# Patient Record
Sex: FEMALE | Race: WHITE | NOT HISPANIC OR LATINO | Employment: OTHER | URBAN - METROPOLITAN AREA
[De-identification: names, ages, dates, MRNs, and addresses within clinical notes are randomized per-mention and may not be internally consistent; named-entity substitution may affect disease eponyms.]

---

## 2017-04-03 ENCOUNTER — ANESTHESIA EVENT (OUTPATIENT)
Dept: SURGERY | Facility: CLINIC | Age: 77
DRG: 483 | End: 2017-04-03
Payer: MEDICARE

## 2017-04-03 ENCOUNTER — HOSPITAL ENCOUNTER (INPATIENT)
Facility: CLINIC | Age: 77
LOS: 1 days | Discharge: HOME | DRG: 483 | End: 2017-04-04
Attending: ORTHOPAEDIC SURGERY | Admitting: ORTHOPAEDIC SURGERY
Payer: MEDICARE

## 2017-04-03 ENCOUNTER — ANESTHESIA (OUTPATIENT)
Dept: SURGERY | Facility: CLINIC | Age: 77
DRG: 483 | End: 2017-04-03
Payer: MEDICARE

## 2017-04-03 ENCOUNTER — APPOINTMENT (OUTPATIENT)
Dept: GENERAL RADIOLOGY | Facility: CLINIC | Age: 77
DRG: 483 | End: 2017-04-03
Attending: ORTHOPAEDIC SURGERY
Payer: MEDICARE

## 2017-04-03 DIAGNOSIS — Z96.611 S/P REVERSE TOTAL SHOULDER ARTHROPLASTY, RIGHT: Primary | ICD-10-CM

## 2017-04-03 PROBLEM — Z96.619 S/P REVERSE TOTAL SHOULDER ARTHROPLASTY: Status: ACTIVE | Noted: 2017-04-03

## 2017-04-03 LAB
ANION GAP SERPL CALCULATED.3IONS-SCNC: 6 MMOL/L (ref 3–14)
BUN SERPL-MCNC: 23 MG/DL (ref 7–30)
CALCIUM SERPL-MCNC: 9.5 MG/DL (ref 8.5–10.1)
CHLORIDE SERPL-SCNC: 106 MMOL/L (ref 94–109)
CO2 SERPL-SCNC: 31 MMOL/L (ref 20–32)
CREAT SERPL-MCNC: 0.72 MG/DL (ref 0.52–1.04)
GFR SERPL CREATININE-BSD FRML MDRD: 80 ML/MIN/1.7M2
GLUCOSE SERPL-MCNC: 90 MG/DL (ref 70–99)
HGB BLD-MCNC: 15.4 G/DL (ref 11.7–15.7)
POTASSIUM SERPL-SCNC: 3.2 MMOL/L (ref 3.4–5.3)
SODIUM SERPL-SCNC: 143 MMOL/L (ref 133–144)

## 2017-04-03 PROCEDURE — 85018 HEMOGLOBIN: CPT | Performed by: ORTHOPAEDIC SURGERY

## 2017-04-03 PROCEDURE — 25000128 H RX IP 250 OP 636: Performed by: ORTHOPAEDIC SURGERY

## 2017-04-03 PROCEDURE — 40000940 XR SHOULDER RT PORT G/E 2 VW: Mod: RT

## 2017-04-03 PROCEDURE — 25000128 H RX IP 250 OP 636: Performed by: ANESTHESIOLOGY

## 2017-04-03 PROCEDURE — 25800025 ZZH RX 258: Performed by: ANESTHESIOLOGY

## 2017-04-03 PROCEDURE — S0020 INJECTION, BUPIVICAINE HYDRO: HCPCS | Performed by: ANESTHESIOLOGY

## 2017-04-03 PROCEDURE — 71000012 ZZH RECOVERY PHASE 1 LEVEL 1 FIRST HR: Performed by: ORTHOPAEDIC SURGERY

## 2017-04-03 PROCEDURE — A9270 NON-COVERED ITEM OR SERVICE: HCPCS | Mod: GY | Performed by: ANESTHESIOLOGY

## 2017-04-03 PROCEDURE — 25000125 ZZHC RX 250: Performed by: ORTHOPAEDIC SURGERY

## 2017-04-03 PROCEDURE — 25000128 H RX IP 250 OP 636: Performed by: NURSE ANESTHETIST, CERTIFIED REGISTERED

## 2017-04-03 PROCEDURE — 25800025 ZZH RX 258: Performed by: ORTHOPAEDIC SURGERY

## 2017-04-03 PROCEDURE — 25000125 ZZHC RX 250: Performed by: NURSE ANESTHETIST, CERTIFIED REGISTERED

## 2017-04-03 PROCEDURE — 27810169 ZZH OR IMPLANT GENERAL: Performed by: ORTHOPAEDIC SURGERY

## 2017-04-03 PROCEDURE — 40000171 ZZH STATISTIC PRE-PROCEDURE ASSESSMENT III: Performed by: ORTHOPAEDIC SURGERY

## 2017-04-03 PROCEDURE — C1776 JOINT DEVICE (IMPLANTABLE): HCPCS | Performed by: ORTHOPAEDIC SURGERY

## 2017-04-03 PROCEDURE — 0RRJ00Z REPLACEMENT OF RIGHT SHOULDER JOINT WITH REVERSE BALL AND SOCKET SYNTHETIC SUBSTITUTE, OPEN APPROACH: ICD-10-PCS | Performed by: ORTHOPAEDIC SURGERY

## 2017-04-03 PROCEDURE — 27210794 ZZH OR GENERAL SUPPLY STERILE: Performed by: ORTHOPAEDIC SURGERY

## 2017-04-03 PROCEDURE — 25000566 ZZH SEVOFLURANE, EA 15 MIN: Performed by: ORTHOPAEDIC SURGERY

## 2017-04-03 PROCEDURE — 37000009 ZZH ANESTHESIA TECHNICAL FEE, EACH ADDTL 15 MIN: Performed by: ORTHOPAEDIC SURGERY

## 2017-04-03 PROCEDURE — 25000132 ZZH RX MED GY IP 250 OP 250 PS 637: Mod: GY | Performed by: ANESTHESIOLOGY

## 2017-04-03 PROCEDURE — A9270 NON-COVERED ITEM OR SERVICE: HCPCS | Mod: GY | Performed by: NURSE ANESTHETIST, CERTIFIED REGISTERED

## 2017-04-03 PROCEDURE — 27210995 ZZH RX 272: Performed by: ORTHOPAEDIC SURGERY

## 2017-04-03 PROCEDURE — 80048 BASIC METABOLIC PNL TOTAL CA: CPT | Performed by: ORTHOPAEDIC SURGERY

## 2017-04-03 PROCEDURE — A9270 NON-COVERED ITEM OR SERVICE: HCPCS | Mod: GY | Performed by: ORTHOPAEDIC SURGERY

## 2017-04-03 PROCEDURE — C1713 ANCHOR/SCREW BN/BN,TIS/BN: HCPCS | Performed by: ORTHOPAEDIC SURGERY

## 2017-04-03 PROCEDURE — 25000125 ZZHC RX 250: Performed by: ANESTHESIOLOGY

## 2017-04-03 PROCEDURE — 36415 COLL VENOUS BLD VENIPUNCTURE: CPT | Performed by: ORTHOPAEDIC SURGERY

## 2017-04-03 PROCEDURE — 27211024 ZZHC OR SUPPLY OTHER OPNP: Performed by: ORTHOPAEDIC SURGERY

## 2017-04-03 PROCEDURE — 25000132 ZZH RX MED GY IP 250 OP 250 PS 637: Mod: GY | Performed by: ORTHOPAEDIC SURGERY

## 2017-04-03 PROCEDURE — L3670 SO ACRO/CLAV CAN WEB PRE OTS: HCPCS

## 2017-04-03 PROCEDURE — 36000063 ZZH SURGERY LEVEL 4 EA 15 ADDTL MIN: Performed by: ORTHOPAEDIC SURGERY

## 2017-04-03 PROCEDURE — 36000093 ZZH SURGERY LEVEL 4 1ST 30 MIN: Performed by: ORTHOPAEDIC SURGERY

## 2017-04-03 PROCEDURE — 25000132 ZZH RX MED GY IP 250 OP 250 PS 637: Mod: GY | Performed by: NURSE ANESTHETIST, CERTIFIED REGISTERED

## 2017-04-03 PROCEDURE — 37000008 ZZH ANESTHESIA TECHNICAL FEE, 1ST 30 MIN: Performed by: ORTHOPAEDIC SURGERY

## 2017-04-03 PROCEDURE — 12000007 ZZH R&B INTERMEDIATE

## 2017-04-03 DEVICE — CEMENT RESTRICTOR 24MM EBO101: Type: IMPLANTABLE DEVICE | Site: SHOULDER | Status: FUNCTIONAL

## 2017-04-03 DEVICE — BONE CEMENT RADIOPAQUE SIMPLEX HV FULL DOSE 6194-1-001: Type: IMPLANTABLE DEVICE | Site: SHOULDER | Status: FUNCTIONAL

## 2017-04-03 DEVICE — IMP STEM SHOULDER 9.0X100MM AQLS DWB945: Type: IMPLANTABLE DEVICE | Site: SHOULDER | Status: FUNCTIONAL

## 2017-04-03 DEVICE — IMP SHOULDER HUMERAL HEAD METAPHYSIS 36MM DWB960: Type: IMPLANTABLE DEVICE | Site: SHOULDER | Status: FUNCTIONAL

## 2017-04-03 DEVICE — IMP SHOULDER HUMERAL HEAD +9MM 36MM DWB994: Type: IMPLANTABLE DEVICE | Site: SHOULDER | Status: FUNCTIONAL

## 2017-04-03 DEVICE — IMP SCR LOCKING 4.5X32MM AQLS DWD032: Type: IMPLANTABLE DEVICE | Site: SHOULDER | Status: FUNCTIONAL

## 2017-04-03 DEVICE — IMP COMP SHLDR GLENOID SPHERE AQLS REV II 36X25MM DWD180: Type: IMPLANTABLE DEVICE | Site: SHOULDER | Status: FUNCTIONAL

## 2017-04-03 DEVICE — IMP SCR FIXATION 4.5X18MM AQLS VDV218: Type: IMPLANTABLE DEVICE | Site: SHOULDER | Status: FUNCTIONAL

## 2017-04-03 DEVICE — IMP BASEPLATE SHLDR GLENOID AQLS REV II 25MM DWD170: Type: IMPLANTABLE DEVICE | Site: SHOULDER | Status: FUNCTIONAL

## 2017-04-03 DEVICE — IMP GLENOID SHOULDER +9MM 36MM AQLS DWB931: Type: IMPLANTABLE DEVICE | Site: SHOULDER | Status: FUNCTIONAL

## 2017-04-03 DEVICE — IMP SCR LOCKING 4.5X29MM AQLS DWD029: Type: IMPLANTABLE DEVICE | Site: SHOULDER | Status: FUNCTIONAL

## 2017-04-03 DEVICE — IMP SCR FIXATION 4.5X20MM AQLS VDV220: Type: IMPLANTABLE DEVICE | Site: SHOULDER | Status: FUNCTIONAL

## 2017-04-03 RX ORDER — CYCLOBENZAPRINE HCL 5 MG
5 TABLET ORAL 3 TIMES DAILY PRN
Status: DISCONTINUED | OUTPATIENT
Start: 2017-04-03 | End: 2017-04-04 | Stop reason: HOSPADM

## 2017-04-03 RX ORDER — HYDRALAZINE HYDROCHLORIDE 20 MG/ML
2.5-5 INJECTION INTRAMUSCULAR; INTRAVENOUS EVERY 10 MIN PRN
Status: CANCELLED | OUTPATIENT
Start: 2017-04-03

## 2017-04-03 RX ORDER — ALBUTEROL SULFATE 0.83 MG/ML
2.5 SOLUTION RESPIRATORY (INHALATION) EVERY 4 HOURS PRN
Status: CANCELLED | OUTPATIENT
Start: 2017-04-03

## 2017-04-03 RX ORDER — NALOXONE HYDROCHLORIDE 0.4 MG/ML
.1-.4 INJECTION, SOLUTION INTRAMUSCULAR; INTRAVENOUS; SUBCUTANEOUS
Status: DISCONTINUED | OUTPATIENT
Start: 2017-04-03 | End: 2017-04-04 | Stop reason: HOSPADM

## 2017-04-03 RX ORDER — ONDANSETRON 4 MG/1
4 TABLET, ORALLY DISINTEGRATING ORAL EVERY 30 MIN PRN
Status: CANCELLED | OUTPATIENT
Start: 2017-04-03

## 2017-04-03 RX ORDER — LIDOCAINE HYDROCHLORIDE 20 MG/ML
INJECTION, SOLUTION INFILTRATION; PERINEURAL PRN
Status: DISCONTINUED | OUTPATIENT
Start: 2017-04-03 | End: 2017-04-03

## 2017-04-03 RX ORDER — LIDOCAINE 40 MG/G
CREAM TOPICAL
Status: DISCONTINUED | OUTPATIENT
Start: 2017-04-03 | End: 2017-04-04 | Stop reason: HOSPADM

## 2017-04-03 RX ORDER — ONDANSETRON 2 MG/ML
4 INJECTION INTRAMUSCULAR; INTRAVENOUS EVERY 30 MIN PRN
Status: CANCELLED | OUTPATIENT
Start: 2017-04-03

## 2017-04-03 RX ORDER — CEFAZOLIN SODIUM 1 G/3ML
1 INJECTION, POWDER, FOR SOLUTION INTRAMUSCULAR; INTRAVENOUS SEE ADMIN INSTRUCTIONS
Status: DISCONTINUED | OUTPATIENT
Start: 2017-04-03 | End: 2017-04-03

## 2017-04-03 RX ORDER — NEOSTIGMINE METHYLSULFATE 1 MG/ML
VIAL (ML) INJECTION PRN
Status: DISCONTINUED | OUTPATIENT
Start: 2017-04-03 | End: 2017-04-03

## 2017-04-03 RX ORDER — AMOXICILLIN 250 MG
1-2 CAPSULE ORAL 2 TIMES DAILY
Status: DISCONTINUED | OUTPATIENT
Start: 2017-04-03 | End: 2017-04-04 | Stop reason: HOSPADM

## 2017-04-03 RX ORDER — KETOROLAC TROMETHAMINE 15 MG/ML
15 INJECTION, SOLUTION INTRAMUSCULAR; INTRAVENOUS
Status: CANCELLED | OUTPATIENT
Start: 2017-04-03

## 2017-04-03 RX ORDER — MAGNESIUM HYDROXIDE 1200 MG/15ML
LIQUID ORAL PRN
Status: DISCONTINUED | OUTPATIENT
Start: 2017-04-03 | End: 2017-04-03 | Stop reason: HOSPADM

## 2017-04-03 RX ORDER — FLUTICASONE PROPIONATE AND SALMETEROL XINAFOATE 115; 21 UG/1; UG/1
2 AEROSOL, METERED RESPIRATORY (INHALATION) 2 TIMES DAILY PRN
Status: DISCONTINUED | OUTPATIENT
Start: 2017-04-03 | End: 2017-04-03

## 2017-04-03 RX ORDER — GLYCOPYRROLATE 0.2 MG/ML
INJECTION, SOLUTION INTRAMUSCULAR; INTRAVENOUS PRN
Status: DISCONTINUED | OUTPATIENT
Start: 2017-04-03 | End: 2017-04-03

## 2017-04-03 RX ORDER — HYDROMORPHONE HYDROCHLORIDE 1 MG/ML
.3-.5 INJECTION, SOLUTION INTRAMUSCULAR; INTRAVENOUS; SUBCUTANEOUS EVERY 5 MIN PRN
Status: CANCELLED | OUTPATIENT
Start: 2017-04-03

## 2017-04-03 RX ORDER — ATORVASTATIN CALCIUM 40 MG/1
40 TABLET, FILM COATED ORAL DAILY
Status: DISCONTINUED | OUTPATIENT
Start: 2017-04-04 | End: 2017-04-04 | Stop reason: HOSPADM

## 2017-04-03 RX ORDER — ONDANSETRON 2 MG/ML
INJECTION INTRAMUSCULAR; INTRAVENOUS PRN
Status: DISCONTINUED | OUTPATIENT
Start: 2017-04-03 | End: 2017-04-03

## 2017-04-03 RX ORDER — MULTIVIT WITH MINERALS/LUTEIN
1 TABLET ORAL DAILY
COMMUNITY

## 2017-04-03 RX ORDER — DEXTROSE MONOHYDRATE, SODIUM CHLORIDE, AND POTASSIUM CHLORIDE 50; 1.49; 4.5 G/1000ML; G/1000ML; G/1000ML
INJECTION, SOLUTION INTRAVENOUS CONTINUOUS
Status: DISCONTINUED | OUTPATIENT
Start: 2017-04-03 | End: 2017-04-04 | Stop reason: HOSPADM

## 2017-04-03 RX ORDER — FENTANYL CITRATE 50 UG/ML
25-50 INJECTION, SOLUTION INTRAMUSCULAR; INTRAVENOUS
Status: COMPLETED | OUTPATIENT
Start: 2017-04-03 | End: 2017-04-03

## 2017-04-03 RX ORDER — FENTANYL CITRATE 50 UG/ML
25-50 INJECTION, SOLUTION INTRAMUSCULAR; INTRAVENOUS
Status: CANCELLED | OUTPATIENT
Start: 2017-04-03

## 2017-04-03 RX ORDER — CEFAZOLIN SODIUM 2 G/100ML
2 INJECTION, SOLUTION INTRAVENOUS
Status: COMPLETED | OUTPATIENT
Start: 2017-04-03 | End: 2017-04-03

## 2017-04-03 RX ORDER — HYDROMORPHONE HYDROCHLORIDE 1 MG/ML
.3-.5 INJECTION, SOLUTION INTRAMUSCULAR; INTRAVENOUS; SUBCUTANEOUS
Status: DISCONTINUED | OUTPATIENT
Start: 2017-04-03 | End: 2017-04-04 | Stop reason: HOSPADM

## 2017-04-03 RX ORDER — FENTANYL CITRATE 50 UG/ML
INJECTION, SOLUTION INTRAMUSCULAR; INTRAVENOUS PRN
Status: DISCONTINUED | OUTPATIENT
Start: 2017-04-03 | End: 2017-04-03

## 2017-04-03 RX ORDER — OXYCODONE HYDROCHLORIDE 5 MG/1
5-10 TABLET ORAL EVERY 4 HOURS PRN
Status: DISCONTINUED | OUTPATIENT
Start: 2017-04-03 | End: 2017-04-04

## 2017-04-03 RX ORDER — SODIUM CHLORIDE, SODIUM LACTATE, POTASSIUM CHLORIDE, CALCIUM CHLORIDE 600; 310; 30; 20 MG/100ML; MG/100ML; MG/100ML; MG/100ML
INJECTION, SOLUTION INTRAVENOUS CONTINUOUS
Status: DISCONTINUED | OUTPATIENT
Start: 2017-04-03 | End: 2017-04-04 | Stop reason: ALTCHOICE

## 2017-04-03 RX ORDER — ACETAMINOPHEN 325 MG/1
975 TABLET ORAL ONCE
Status: COMPLETED | OUTPATIENT
Start: 2017-04-03 | End: 2017-04-03

## 2017-04-03 RX ORDER — PROPOFOL 10 MG/ML
INJECTION, EMULSION INTRAVENOUS PRN
Status: DISCONTINUED | OUTPATIENT
Start: 2017-04-03 | End: 2017-04-03

## 2017-04-03 RX ORDER — ACETAMINOPHEN 325 MG/1
975 TABLET ORAL EVERY 8 HOURS
Status: DISCONTINUED | OUTPATIENT
Start: 2017-04-03 | End: 2017-04-04 | Stop reason: HOSPADM

## 2017-04-03 RX ORDER — DIPHENHYDRAMINE HCL 12.5MG/5ML
12.5 LIQUID (ML) ORAL EVERY 6 HOURS PRN
Status: DISCONTINUED | OUTPATIENT
Start: 2017-04-03 | End: 2017-04-04 | Stop reason: HOSPADM

## 2017-04-03 RX ORDER — ACETAMINOPHEN 325 MG/1
650 TABLET ORAL EVERY 4 HOURS PRN
Status: DISCONTINUED | OUTPATIENT
Start: 2017-04-06 | End: 2017-04-04 | Stop reason: HOSPADM

## 2017-04-03 RX ORDER — LISINOPRIL AND HYDROCHLOROTHIAZIDE 12.5; 2 MG/1; MG/1
1 TABLET ORAL DAILY
Status: DISCONTINUED | OUTPATIENT
Start: 2017-04-04 | End: 2017-04-04 | Stop reason: HOSPADM

## 2017-04-03 RX ORDER — LABETALOL HYDROCHLORIDE 5 MG/ML
10 INJECTION, SOLUTION INTRAVENOUS
Status: CANCELLED | OUTPATIENT
Start: 2017-04-03

## 2017-04-03 RX ORDER — LISINOPRIL AND HYDROCHLOROTHIAZIDE 12.5; 2 MG/1; MG/1
1 TABLET ORAL DAILY
COMMUNITY

## 2017-04-03 RX ORDER — DIPHENHYDRAMINE HYDROCHLORIDE 50 MG/ML
12.5 INJECTION INTRAMUSCULAR; INTRAVENOUS EVERY 6 HOURS PRN
Status: DISCONTINUED | OUTPATIENT
Start: 2017-04-03 | End: 2017-04-04 | Stop reason: HOSPADM

## 2017-04-03 RX ORDER — DEXAMETHASONE SODIUM PHOSPHATE 4 MG/ML
INJECTION, SOLUTION INTRA-ARTICULAR; INTRALESIONAL; INTRAMUSCULAR; INTRAVENOUS; SOFT TISSUE PRN
Status: DISCONTINUED | OUTPATIENT
Start: 2017-04-03 | End: 2017-04-03

## 2017-04-03 RX ORDER — ALBUTEROL SULFATE 90 UG/1
AEROSOL, METERED RESPIRATORY (INHALATION) PRN
Status: DISCONTINUED | OUTPATIENT
Start: 2017-04-03 | End: 2017-04-03

## 2017-04-03 RX ORDER — PROPOFOL 10 MG/ML
INJECTION, EMULSION INTRAVENOUS CONTINUOUS PRN
Status: DISCONTINUED | OUTPATIENT
Start: 2017-04-03 | End: 2017-04-03

## 2017-04-03 RX ORDER — SODIUM CHLORIDE, SODIUM LACTATE, POTASSIUM CHLORIDE, CALCIUM CHLORIDE 600; 310; 30; 20 MG/100ML; MG/100ML; MG/100ML; MG/100ML
INJECTION, SOLUTION INTRAVENOUS CONTINUOUS
Status: CANCELLED | OUTPATIENT
Start: 2017-04-03

## 2017-04-03 RX ORDER — CEFAZOLIN SODIUM 2 G/100ML
2 INJECTION, SOLUTION INTRAVENOUS EVERY 8 HOURS
Status: COMPLETED | OUTPATIENT
Start: 2017-04-03 | End: 2017-04-04

## 2017-04-03 RX ORDER — EPHEDRINE SULFATE 50 MG/ML
INJECTION, SOLUTION INTRAMUSCULAR; INTRAVENOUS; SUBCUTANEOUS PRN
Status: DISCONTINUED | OUTPATIENT
Start: 2017-04-03 | End: 2017-04-03

## 2017-04-03 RX ADMIN — POTASSIUM CHLORIDE, DEXTROSE MONOHYDRATE AND SODIUM CHLORIDE: 150; 5; 450 INJECTION, SOLUTION INTRAVENOUS at 17:20

## 2017-04-03 RX ADMIN — PHENYLEPHRINE HYDROCHLORIDE 100 MCG: 10 INJECTION, SOLUTION INTRAMUSCULAR; INTRAVENOUS; SUBCUTANEOUS at 12:57

## 2017-04-03 RX ADMIN — PHENYLEPHRINE HYDROCHLORIDE 100 MCG: 10 INJECTION, SOLUTION INTRAMUSCULAR; INTRAVENOUS; SUBCUTANEOUS at 13:03

## 2017-04-03 RX ADMIN — SODIUM CHLORIDE, POTASSIUM CHLORIDE, SODIUM LACTATE AND CALCIUM CHLORIDE: 600; 310; 30; 20 INJECTION, SOLUTION INTRAVENOUS at 12:02

## 2017-04-03 RX ADMIN — PROPOFOL 50 MCG/KG/MIN: 10 INJECTION, EMULSION INTRAVENOUS at 12:45

## 2017-04-03 RX ADMIN — NEOSTIGMINE METHYLSULFATE 3 MG: 1 INJECTION INTRAMUSCULAR; INTRAVENOUS; SUBCUTANEOUS at 14:45

## 2017-04-03 RX ADMIN — GLYCOPYRROLATE 0.4 MG: 0.2 INJECTION, SOLUTION INTRAMUSCULAR; INTRAVENOUS at 14:45

## 2017-04-03 RX ADMIN — PHENYLEPHRINE HYDROCHLORIDE 100 MCG: 10 INJECTION, SOLUTION INTRAMUSCULAR; INTRAVENOUS; SUBCUTANEOUS at 12:50

## 2017-04-03 RX ADMIN — ROCURONIUM BROMIDE 10 MG: 10 INJECTION INTRAVENOUS at 13:36

## 2017-04-03 RX ADMIN — Medication 10 MG: at 12:50

## 2017-04-03 RX ADMIN — PHENYLEPHRINE HYDROCHLORIDE 200 MCG: 10 INJECTION, SOLUTION INTRAMUSCULAR; INTRAVENOUS; SUBCUTANEOUS at 13:34

## 2017-04-03 RX ADMIN — PHENYLEPHRINE HYDROCHLORIDE 200 MCG: 10 INJECTION, SOLUTION INTRAMUSCULAR; INTRAVENOUS; SUBCUTANEOUS at 14:03

## 2017-04-03 RX ADMIN — CEFAZOLIN SODIUM 2 G: 2 INJECTION, SOLUTION INTRAVENOUS at 21:09

## 2017-04-03 RX ADMIN — PROPOFOL 150 MG: 10 INJECTION, EMULSION INTRAVENOUS at 12:31

## 2017-04-03 RX ADMIN — Medication 10 MG: at 14:11

## 2017-04-03 RX ADMIN — PHENYLEPHRINE HYDROCHLORIDE 100 MCG: 10 INJECTION, SOLUTION INTRAMUSCULAR; INTRAVENOUS; SUBCUTANEOUS at 13:18

## 2017-04-03 RX ADMIN — DEXAMETHASONE SODIUM PHOSPHATE 4 MG: 4 INJECTION, SOLUTION INTRAMUSCULAR; INTRAVENOUS at 13:13

## 2017-04-03 RX ADMIN — EPINEPHRINE 20 ML GIVEN: 1 INJECTION, SOLUTION INTRAMUSCULAR; SUBCUTANEOUS at 12:03

## 2017-04-03 RX ADMIN — MIDAZOLAM HYDROCHLORIDE 1 MG: 1 INJECTION, SOLUTION INTRAMUSCULAR; INTRAVENOUS at 11:56

## 2017-04-03 RX ADMIN — GLYCOPYRROLATE 0.2 MG: 0.2 INJECTION, SOLUTION INTRAMUSCULAR; INTRAVENOUS at 12:59

## 2017-04-03 RX ADMIN — FENTANYL CITRATE 100 MCG: 50 INJECTION, SOLUTION INTRAMUSCULAR; INTRAVENOUS at 12:31

## 2017-04-03 RX ADMIN — CEFAZOLIN SODIUM 2 G: 2 INJECTION, SOLUTION INTRAVENOUS at 12:45

## 2017-04-03 RX ADMIN — Medication 5 MG: at 14:01

## 2017-04-03 RX ADMIN — ROCURONIUM BROMIDE 50 MG: 10 INJECTION INTRAVENOUS at 12:32

## 2017-04-03 RX ADMIN — LIDOCAINE HYDROCHLORIDE 80 MG: 20 INJECTION, SOLUTION INFILTRATION; PERINEURAL at 12:31

## 2017-04-03 RX ADMIN — SODIUM CHLORIDE, POTASSIUM CHLORIDE, SODIUM LACTATE AND CALCIUM CHLORIDE: 600; 310; 30; 20 INJECTION, SOLUTION INTRAVENOUS at 14:15

## 2017-04-03 RX ADMIN — Medication 12 MCG: at 12:31

## 2017-04-03 RX ADMIN — ONDANSETRON 4 MG: 2 INJECTION INTRAMUSCULAR; INTRAVENOUS at 13:13

## 2017-04-03 RX ADMIN — FENTANYL CITRATE 50 MCG: 50 INJECTION, SOLUTION INTRAMUSCULAR; INTRAVENOUS at 11:55

## 2017-04-03 RX ADMIN — SENNOSIDES AND DOCUSATE SODIUM 1 TABLET: 8.6; 5 TABLET ORAL at 21:09

## 2017-04-03 RX ADMIN — ACETAMINOPHEN 975 MG: 325 TABLET, FILM COATED ORAL at 12:19

## 2017-04-03 RX ADMIN — ALBUTEROL SULFATE 6 PUFF: 90 AEROSOL, METERED RESPIRATORY (INHALATION) at 12:38

## 2017-04-03 RX ADMIN — PHENYLEPHRINE HYDROCHLORIDE 100 MCG: 10 INJECTION, SOLUTION INTRAMUSCULAR; INTRAVENOUS; SUBCUTANEOUS at 13:23

## 2017-04-03 RX ADMIN — ACETAMINOPHEN 975 MG: 325 TABLET, FILM COATED ORAL at 21:09

## 2017-04-03 ASSESSMENT — ENCOUNTER SYMPTOMS
SEIZURES: 0
DYSRHYTHMIAS: 0

## 2017-04-03 ASSESSMENT — LIFESTYLE VARIABLES: TOBACCO_USE: 0

## 2017-04-03 ASSESSMENT — COPD QUESTIONNAIRES: COPD: 0

## 2017-04-03 NOTE — ANESTHESIA PROCEDURE NOTES
Peripheral nerve/Neuraxial procedure note : Interscalene  Pre-Procedure  Performed by LEONOR GRANT  Location: pre-op      Pre-Anesthestic Checklist: patient identified, IV checked, site marked, risks and benefits discussed, informed consent, monitors and equipment checked, pre-op evaluation, at physician/surgeon's request and post-op pain management    Timeout  Correct Patient: Yes   Correct Procedure: Yes   Correct Site: Yes   Correct Laterality: Yes   Correct Position: Yes   Site Marked: Yes   .   Procedure Documentation    .    Procedure:    Interscalene.  Local skin infiltrated with 1 mL of 1% lidocaine.     Ultrasound used to identify targeted nerve, plexus, or vascular marker and placed a needle adjacent to it., Ultrasound was used to visualize the spread of the anesthetic in close proximity to the above stated nerve. A permanent image is entered into the patient's record.  Patient Prep;chlorhexidine gluconate and isopropyl alcohol.  .  Needle: insulated (21 G. 100 mm ). .  Spinal Needle: . . Insertion Method: Single Shot.     Assessment/Narrative  Paresthesias: No.  .  The placement was negative for: blood aspirated, painful injection and site bleeding.  Bolus given via needle..   Secured via.   Complications: none. Comments:  Bolus via needle, 20 ml of 0.5% Bupivacaine with 5 mcg/ml of 1:400,000 epinephrine.  Patient tolerated well, was mildly sedated but communicative throughout the procedure.   The surgeon has given a verbal order transferring care of this patient to me for the performance of regional analgesia block for post op pain control. It is requested of me because I am uniquely trained and qualified to perform this block and the surgeon is neither trained nor qualified to perform this procedure.

## 2017-04-03 NOTE — OP NOTE
PATIENT NAME:  Ivania Rubi  MEDICAL RECORD #: [id number]  PATIENT BIRTHDAY:           1940  DATE OF SURGERY: 4/3/2017    SURGEON:     Kevin Mc MD     1st ASSISTANTt:  CASEY Hoyt OPA-C      PREOPERATIVE DIAGNOSIS:  Rotator cuff arthropathy right shoulder.      POSTOPERATIVE DIAGNOSIS:  Rotator cuff arthropathy right shoulder.      PROCEDURE: right reverse total shoulder arthroplasty.    COMPONENTS USED:  Tornier 9 mm humeral stem, 9 mm metaphyseal extension insert, 9 mm humeral poly insert,   25 mm glenoid baseplate, 36 mm glenosphere, plus appropriate length compression and locking screws.     INDICATIONS FOR PROCEDURE:  The patient is brought to the operating room for elective right Reverse TSA for rotator cuff arthropathy.  Preoperative IV antibiotics were given and will be continued for 24 hours post-op.  The patient understands the indications, alternatives, risks, benefits, and time involved for recovery and is consented for the procedure.        DESCRIPTION OF PROCEDURE:  The patient was brought to the operating room on 4/3/2017 and following suitable general anesthesia, the patient was placed in the semi-sitting position and the right shoulder prepped and draped in the usual manner.      A full time out was carried out and the patient, proper extremity, operative site and procedure identified and confirmed by all members of the operative team.      We used the superolateral approach with an incision made over the distal clavicle, the anterior edge of the acromion, out to the anterolateral edge of the acromion and down in line with the deltoid fibers for 5 cm.  The deltoid was split in line with its fibers for 4 cm and released from the anterior edge of the acromion and clavicle.  The underlying rotator cuff was completely torn and retracted, especially involving the supraspinatus and infraspinatus.  The subscapularis was partially released along the superior attachment.  The long  head biceps tendon was torn and retracted distally.      The rotator cuff was debrided superiorly to allow for superior and anterior dislocation of the humerus.  We then resected the proximal end of the humerus in the usual fashion in 15 degrees of retroversion.  We prepared the canal with the diaphyseal reamer first to the 9 mm size followed by the metaphyseal reamer.  We then trialed the 9 mm humeral component and noted it was well seated.      We next retracted the humerus inferiorly.  I did partially release the subscap from the lesser tuberosity and released the glenoid labrum and capsular structures to allow for inferior displacement of the proximally displaced humeral head.  Once we had good exposure of the glenoid, we then prepared the glenoid face by placing a centering hole followed by planing off the face of the glenoid with the circular reamer back to subchondral bone.  The 25 mm base plate was secured to the glenoid with two locking screws and 2 compression screws.  Secure fixation was obtained.  The 36 mm glenosphere was next secured to baseplate.      Attention was directed back to the humerus.  We trialed and determined that the 9 mm polyethylene articular insert would be used.   A single 9 mm metaphyseal ring extension was used.  The trial components were removed.  We prepared the bone surfaces with jet lavage and careful drying technique.  We then cemented the 9 mm stem into position using Simplex cement.  Once the cement was set, we trialed one more time and again selected the 9 mm articular insert which was secured and the shoulder reduced one final time.  Motion, stability and alignment was good.  Tensioning of the deltoid was good.      The wound was irrigated throughout with antibiotic solution using jet lavage.  It was closed over a medium Hemovac drain by initially placing 0 Vicryl in a running fashion to reinforce the edge of the anterior deltoid.  The running suture was placed 1 cm from  the edge of the deltoid.  I then used #2 FiberWire looping around the 0 Vicryl reinforcing suture in the deltoid and then up through drill holes in the distal clavicle and acromion.  After these 4 sutures were placed through the bone and through the anterior deltoid, we then used #1 Ethibond in interrupted and running fashion over the top of the superior portion of the deltoid repair and then also running in the distal portion.  Subcutaneous tissue was closed with 2-0 undyed Vicryl and skin with skin staples.  Sterile soft dressing was applied.  The patient was placed in an UltraSling, awakened and taken to PAR in satisfactory condition.     A surgical assistant was medically necessary and required during this procedure for positioning and retraction.  He was present for the entire procedure.          ROCHELLE DURON MD      CC: Rochelle Duron MD          Fax: [phone]

## 2017-04-03 NOTE — ANESTHESIA POSTPROCEDURE EVALUATION
Patient: Ivania Rubi    Procedure(s):  RIGHT REVERSE TOTAL SHOULDER ARTHROPLASTY - Wound Class: I-Clean    Diagnosis:RIGHT SHOULDER GLENOHUMERAL ARTHRITIS, ROTATOR CUFF ARTHROPATHY  Diagnosis Additional Information: No value filed.    Anesthesia Type:  General, ETT, Periph. Nerve Block for postop pain    Note:  Anesthesia Post Evaluation    Patient location during evaluation: PACU  Patient participation: Able to fully participate in evaluation  Level of consciousness: awake and alert  Pain management: adequate  Airway patency: patent  Cardiovascular status: acceptable  Respiratory status: acceptable  Hydration status: acceptable  PONV: none     Anesthetic complications: None          Last vitals:  Vitals:    04/03/17 1210 04/03/17 1459 04/03/17 1510   BP:  120/64 116/56   Resp: 10 16 18   Temp:  36.2  C (97.1  F)    SpO2: 96% 98% 99%         Electronically Signed By: Lesvia Mendez MD  April 3, 2017  3:25 PM

## 2017-04-03 NOTE — IP AVS SNAPSHOT
[address] [facility]    [address] [place] [address]    Phone:  [phone]                                       After Visit Summary   4/3/2017    Ivania Rubi    MRN: [id number]           After Visit Summary Signature Page     I have received my discharge instructions, and my questions have been answered. I have discussed any challenges I see with this plan with the nurse or doctor.    ..........................................................................................................................................  Patient/Patient Representative Signature      ..........................................................................................................................................  Patient Representative Print Name and Relationship to Patient    ..................................................               ................................................  Date                                            Time    ..........................................................................................................................................  Reviewed by Signature/Title    ...................................................              ..............................................  Date                                                            Time

## 2017-04-03 NOTE — ANESTHESIA PREPROCEDURE EVALUATION
Procedure: Procedure(s):  REVERSE ARTHROPLASTY SHOULDER  Preop diagnosis: RIGHT SHOULDER GLENOHUMERAL ARTHRITIS, ROTATOR CUFF ARTHROPATHY    No Known Allergies  Past Medical History:   Diagnosis Date     Allergic rhinitis      Anemia      Arthritis     osteo arthritis     Depressive disorder, not elsewhere classified      Diarrhea      Displacement of cervical intervertebral disc without myelopathy      Gastroesophageal reflux disease      Hypotension, unspecified      Hypovitaminosis D      Nontoxic multinodular goiter      Obesity      Obesity      PONV (postoperative nausea and vomiting)      Pure hypercholesterolemia      Rosacea      Spinal stenosis, lumbar region, without neurogenic claudication      Spinal stenosis, lumbar region, without neurogenic claudication      Symptomatic menopausal or female climacteric states      Unspecified asthma(493.90)      Unspecified essential hypertension      Past Surgical History:   Procedure Laterality Date     ARTHRODESIS FOOT       BACK SURGERY      cervical spinal fusion     BIOPSY      Thyroid     C LUMBAR SPINE FUSION,ANTER APPRCH       CARPAL TUNNEL RELEASE RT/LT       ENT SURGERY      T and A     GYN SURGERY      abdominal hysterectomy     ORTHOPEDIC SURGERY      tka right and left     ORTHOPEDIC SURGERY      knee scopes     ORTHOPEDIC SURGERY      bunionectomy rt and left     PHACOEMULSIFICATION CLEAR CORNEA WITH STANDARD INTRAOCULAR LENS IMPLANT  2/5/2014    Procedure: PHACOEMULSIFICATION CLEAR CORNEA WITH STANDARD INTRAOCULAR LENS IMPLANT;  RIGHT PHACOEMULSIFICATION CLEAR CORNEA WITH STANDARD INTRAOCULAR LENS IMPLANT ;  Surgeon: Marciano Zavala MD;  Location: [facility]     PHACOEMULSIFICATION CLEAR CORNEA WITH STANDARD INTRAOCULAR LENS IMPLANT  2/12/2014    Procedure: PHACOEMULSIFICATION CLEAR CORNEA WITH STANDARD INTRAOCULAR LENS IMPLANT;  LEFT PHACOEMULSIFICATION CLEAR CORNEA WITH STANDARD INTRAOCULAR LENS IMPLANT ;  Surgeon: Marciano Zavala MD;  Location:   [facility]     RELEASE TRIGGER FINGER       Prior to Admission medications    Medication Sig Start Date End Date Taking? Authorizing Provider   Acetaminophen (TYLENOL ARTHRITIS PAIN PO) Take 1,300 mg by mouth 3 times daily   Yes Reported, Patient   Aspirin-Caffeine (THOMAS BACK & BODY) 500-32.5 MG TABS Take 2 tablets by mouth daily as needed   Yes Reported, Patient   CALCIUM PO Take 1 tablet by mouth 2 times daily   Yes Reported, Patient   lisinopril-hydrochlorothiazide (PRINZIDE/ZESTORETIC) 20-12.5 MG per tablet Take 1 tablet by mouth daily   Yes Reported, Patient   Multiple Vitamins-Minerals (CENTRUM SILVER) per tablet Take 1 tablet by mouth daily   Yes Reported, Patient   Omega-3 Fatty Acids (FISH OIL PO) Take 1 capsule by mouth 2 times daily   Yes Reported, Patient   Atorvastatin Calcium (LIPITOR PO) Take 40 mg by mouth daily    Yes Reported, Patient   fluticasone-salmeterol (ADVAIR-HFA) 115-21 MCG/ACT inhaler Inhale 2 puffs into the lungs 2 times daily as needed    Yes Reported, Patient   Cholecalciferol (VITAMIN D-3 PO) Take 1 tablet by mouth every evening    Yes Reported, Patient   citalopram (CELEXA) 20 MG tablet Take 20 mg by mouth every morning    Yes Reported, Patient   omeprazole (PRILOSEC) 20 MG capsule Take 20 mg by mouth every morning    Yes Reported, Patient   MeTHIMazole (TAPAZOLE) 5 MG tablet Take 5 mg by mouth daily    Yes Reported, Patient     Current Facility-Administered Medications Ordered in Epic   Medication Dose Route Frequency Last Rate Last Dose     ceFAZolin sodium-dextrose (ANCEF) infusion 2 g  2 g Intravenous Pre-Op/Pre-procedure x 1 dose         ceFAZolin (ANCEF) 1 g vial to attach to  ml bag for ADULT or 50 ml bag for PEDS  1 g Intravenous See Admin Instructions         lidocaine 1 % 1 mL  1 mL Other Q1H PRN         lactated ringers infusion   Intravenous Continuous         No current [facility]-ordered outpatient prescriptions on file.     Wt Readings from Last 1 Encounters:   02/26/14 102.1  kg (225 lb)     Temp Readings from Last 1 Encounters:   02/26/14 35.2  C (95.4  F) (Oral)     BP Readings from Last 6 Encounters:   02/26/14 166/81   02/12/14 146/84   02/05/14 118/83   01/27/12 149/72     Pulse Readings from Last 4 Encounters:   No data found for Pulse     Resp Readings from Last 1 Encounters:   02/26/14 18     SpO2 Readings from Last 1 Encounters:   02/26/14 95%     No results for input(s): NA, POTASSIUM, CHLORIDE, CO2, ANIONGAP, GLC, BUN, CR, CASTILLO in the last 53014 hours.  Recent Labs   Lab Test  04/03/17   1050   HGB  15.4     ECG: SB, no st or twave abnormalities.         Anesthesia Evaluation     . Pt has had prior anesthetic.     History of anesthetic complications   - PONV        ROS/MED HX    ENT/Pulmonary:     (+)RASTA risk factors hypertension, obese, Intermittent asthma , . .   (-) tobacco use, COPD and sleep apnea   Neurologic:      (-) seizures, CVA and migraines   Cardiovascular:     (+) Dyslipidemia, hypertension----. : . . . :. .      (-) CAD, CASTILLO and arrhythmias   METS/Exercise Tolerance:     Hematologic:        (-) history of blood clots, anemia and other hematologic disorder   Musculoskeletal:        (-) arthritis   GI/Hepatic:     (+) GERD Asymptomatic on medication,      (-) liver disease   Renal/Genitourinary:      (-) renal disease   Endo:     (+) thyroid problem Obesity, .   (-) Type I DM and Type II DM   Psychiatric:         Infectious Disease:        (-) Recent Fever   Malignancy:         Other:                     Physical Exam  Normal systems: cardiovascular, pulmonary and dental    Airway   Mallampati: II  TM distance: >3 FB  Neck ROM: full    Dental     Cardiovascular   Rhythm and rate: regular and normal  (-) no murmur    Pulmonary                     Anesthesia Plan      History & Physical Review      ASA Status:  2 .    NPO Status:  > 8 hours    Plan for General, ETT and Periph. Nerve Block for postop pain with Propofol induction. Maintenance will be Balanced.    PONV  prophylaxis:  Ondansetron (or other 5HT-3) and Dexamethasone or Solumedrol  Additional equipment: Videolaryngoscope Background propofol      Postoperative Care  Postoperative pain management:  IV analgesics and Oral pain medications.      Consents  Anesthetic plan, risks, benefits and alternatives discussed with:  Patient..                          .

## 2017-04-03 NOTE — IP AVS SNAPSHOT
MRN:[id number]                      After Visit Summary   4/3/2017    Ivania Rubi    MRN: [id number]           Thank you!     Thank you for choosing [facility] for your care. Our goal is always to provide you with excellent care. Hearing back from our patients is one way we can continue to improve our services. Please take a few minutes to complete the written survey that you may receive in the mail after you visit with us. Thank you!        Patient Information     Date Of Birth          1940        About your hospital stay     You were admitted on:  April 3, 2017 You last received care in the:  [place] [address] [facility]    You were discharged on:  April 4, 2017        Reason for your hospital stay       Reverse tsa                  Who to Call     For medical emergencies, please call 911.  For non-urgent questions about your medical care, please call your primary care provider or clinic, [phone]  For questions related to your surgery, please call your surgery clinic        Attending Provider     Provider Rochelle Rodriguez MD Orthopedics       Primary Care Provider Office Phone # Fax #    Cj Saha [phone]       [address]  [facility] [address]        Follow-up Appointments     Follow-up and recommended labs and tests        Office visit prearranged                  Further instructions from your care team       DISCHARGE INSTRUCTIONS FOR YOUR TOTAL SHOULDER REPLACEMENT      ROCHELLE DURON MD    Instructions to care for your wound at home:   Change the dressing daily.  Inspect your incision at the time of dressing change for redness, swelling, and drainage.  Some discoloration and bruising is common, but please notify my office if you have any concerns about the wound appearance.  You may shower directly over the wound beginning 4 days following your surgery.  Do not, however, submerge the  wound under water until the sutures are removed and the wound completely sealed and without drainage.  You should let your arm hang at your side during the shower.  Do not actively lift or actively move the arm from your side when out of the sling.  To wash under your arm, simply lean over towards your operative shoulder to allow the arm to hang freely and gently separate from your body by gravity alone.         Assistive devices:  Sling for support.  Remove the sling at least 3 times daily to fully straighten your elbow.  Also move your wrist, hand, and fingers often to mobilize swelling and prevent stiffness.    Wearing a button-up blouse or shirt is recommended.  The shoulder sling or immobilizer may be worn over the outside of your clothes.  Always keep your surgical arm hanging at your side when donning or doffing the sling.  Slide your surgical arm into the shirt sleeve first, then the other arm.  When taking off the shirt or blouse, do the opposite - slide your unaffected arm out of the sleeve first, followed by the surgical arm.     Outpatient physical therapy and home exercises:  Your outpatient physical therapy following your surgery is prearranged by our office.  You should have already scheduled your therapy sessions in advance.  If you have not done so, contact the therapy location of your choice for your appointments for the physical therapy regimen that has been prescribed for you.  You should perform your home exercise program daily in addition to the sessions scheduled with your therapist.    Clinic follow-up appointment:  Your clinic follow-up visit has been prearranged at the time of your surgery scheduling.     Medications:  New discharge medications will include a pain medication and a stool softener while on the narcotic pain medication. Detailed instructions will come with those medications.  You will also receive instructions on when to resume your home medications.    Please use Aspirin 325  "mg, 1 tablet daily for the next 6 weeks to prevent any blood clots from forming.    Antibiotic coverage will be needed before any type of dental procedure.  This is a life long recommendation.  You should notify your dentist of your total shoulder surgery and call your dentist or our office one week before a dental appointment for the antibiotics.       Call [phone] with any questions.     Kevin Mc MD    Pending Results     No orders found for last 3 day(s).            Statement of Approval     Ordered          17 0853  I have reviewed and agree with all the recommendations and orders detailed in this document.  EFFECTIVE NOW     Approved and electronically signed by:  Kevin Mc MD             Admission Information     Date & Time Provider Department Dept. Phone    4/3/2017 Kevin Mc MD [place] [address] [facility] [phone]      Your Vitals Were     Blood Pressure Pulse Temperature Respirations Height Weight    120/64 90 97.9  F (36.6  C) 16 1.676 m (5' 6\") 96.2 kg (212 lb 2 oz)    Pulse Oximetry BMI (Body Mass Index)                95% 34.24 kg/m2          Personal Life [employer] Information     [employer] lets you send messages to your doctor, view your test results, renew your prescriptions, schedule appointments and more. To sign up, go to [email or web address] . Click on \"Log in\" on the left side of the screen, which will take you to the Welcome page. Then click on \"Sign up Now\" on the right side of the page.     You will be asked to enter the access code listed below, as well as some personal information. Please follow the directions to create your username and password.     Your access code is: [email or web address]  Expires: 7/3/2017  2:26 PM     Your access code will  in 90 days. If you need help or a new code, please call your [facility] or [phone].        Care EveryWhere ID     This is your Care EveryWhere ID. This could be used by other organizations " to access your [facility] medical records  [id number]           Review of your medicines      START taking        Dose / Directions    oxyCODONE 5 MG IR tablet   Commonly known as:  ROXICODONE        Dose:  5-10 mg   Take 1-2 tablets (5-10 mg) by mouth every 4 hours as needed for moderate to severe pain   Quantity:  50 tablet   Refills:  0       senna-docusate 8.6-50 MG per tablet   Commonly known as:  SENOKOT-S;PERICOLACE        Dose:  1-2 tablet   Take 1-2 tablets by mouth 2 times daily   Quantity:  50 tablet   Refills:  0         CONTINUE these medicines which have NOT CHANGED        Dose / Directions    THOMAS BACK & BODY 500-32.5 MG Tabs   Generic drug:  Aspirin-Caffeine        Dose:  2 tablet   Take 2 tablets by mouth daily as needed   Refills:  0       CALCIUM PO        Dose:  1 tablet   Take 1 tablet by mouth 2 times daily   Refills:  0       celeXA 20 MG tablet   Generic drug:  citalopram        Dose:  20 mg   Take 20 mg by mouth every morning   Refills:  0       CENTRUM SILVER per tablet        Dose:  1 tablet   Take 1 tablet by mouth daily   Refills:  0       FISH OIL PO        Dose:  1 capsule   Take 1 capsule by mouth 2 times daily   Refills:  0       LIPITOR PO        Dose:  40 mg   Take 40 mg by mouth daily   Refills:  0       lisinopril-hydrochlorothiazide 20-12.5 MG per tablet   Commonly known as:  PRINZIDE/ZESTORETIC        Dose:  1 tablet   Take 1 tablet by mouth daily   Refills:  0       omeprazole 20 MG CR capsule   Commonly known as:  priLOSEC        Dose:  20 mg   Take 20 mg by mouth every morning   Refills:  0       TAPAZOLE 5 MG tablet   Generic drug:  methimazole        Dose:  5 mg   Take 5 mg by mouth daily   Refills:  0       TYLENOL ARTHRITIS PAIN PO        Dose:  1300 mg   Take 1,300 mg by mouth 3 times daily   Refills:  0       VITAMIN D-3 PO        Dose:  1 tablet   Take 1 tablet by mouth every evening   Refills:  0            Where to get your medicines      These medications were  sent to [facility] [place], [place] - [address]  [address], [place] [address]     Phone:  [phone]     senna-docusate 8.6-50 MG per tablet         Some of these will need a paper prescription and others can be bought over the counter. Ask your nurse if you have questions.     Bring a paper prescription for each of these medications     oxyCODONE 5 MG IR tablet                Protect others around you: Learn how to safely use, store and throw away your medicines at www.disposemymeds.org.             Medication List: This is a list of all your medications and when to take them. Check marks below indicate your daily home schedule. Keep this list as a reference.      Medications           Morning Afternoon Evening Bedtime As Needed    THOMAS BACK & BODY 500-32.5 MG Tabs   Take 2 tablets by mouth daily as needed   Generic drug:  Aspirin-Caffeine   Next Dose Due:  Resume on discharge.                                 CALCIUM PO   Take 1 tablet by mouth 2 times daily   Next Dose Due:  Resume on discharge.                                 celeXA 20 MG tablet   Take 20 mg by mouth every morning   Generic drug:  citalopram   Next Dose Due:  Resume on discharge.                                 CENTRUM SILVER per tablet   Take 1 tablet by mouth daily   Next Dose Due:  Resume on discharge.                                 FISH OIL PO   Take 1 capsule by mouth 2 times daily   Next Dose Due:  Resume on discharge.                                 LIPITOR PO   Take 40 mg by mouth daily   Last time this was given:  40 mg on 4/4/2017  8:45 AM   Next Dose Due:  4/5/17                                   lisinopril-hydrochlorothiazide 20-12.5 MG per tablet   Commonly known as:  PRINZIDE/ZESTORETIC   Take 1 tablet by mouth daily   Last time this was given:  1 tablet on 4/4/2017  8:45 AM   Next Dose Due:  4/5/17                                   omeprazole 20 MG CR capsule   Commonly known as:  priLOSEC    Take 20 mg by mouth every morning   Last time this was given:  20 mg on 4/4/2017  8:45 AM   Next Dose Due:  4/5/17                                   oxyCODONE 5 MG IR tablet   Commonly known as:  ROXICODONE   Take 1-2 tablets (5-10 mg) by mouth every 4 hours as needed for moderate to severe pain   Last time this was given:  10 mg on 4/4/2017  3:17 PM   Next Dose Due:  4/5/17                                   senna-docusate 8.6-50 MG per tablet   Commonly known as:  SENOKOT-S;PERICOLACE   Take 1-2 tablets by mouth 2 times daily   Last time this was given:  2 tablets on 4/4/2017  8:44 AM   Next Dose Due:  4/4/17                                   TAPAZOLE 5 MG tablet   Take 5 mg by mouth daily   Generic drug:  methimazole   Next Dose Due:  Resume on discharge.                                 TYLENOL ARTHRITIS PAIN PO   Take 1,300 mg by mouth 3 times daily   Next Dose Due:  Resume on discharge.                                 VITAMIN D-3 PO   Take 1 tablet by mouth every evening   Next Dose Due:  Resume on discharge.

## 2017-04-03 NOTE — PROGRESS NOTES
S. We received an order for an right shoulder Ultrasling III at the main OR.    O/goal. To reduce motion and help with post-op support    A. At this time, I have provided the main OR with a size medium Fabián Dutta Ultrasling III.    P. Staffing have been instructed to contact our facility with any future questions and/or concerns.

## 2017-04-03 NOTE — ANESTHESIA CARE TRANSFER NOTE
Patient: Ivania Rubi    Procedure(s):  RIGHT REVERSE TOTAL SHOULDER ARTHROPLASTY - Wound Class: I-Clean    Diagnosis: RIGHT SHOULDER GLENOHUMERAL ARTHRITIS, ROTATOR CUFF ARTHROPATHY  Diagnosis Additional Information: No value filed.    Anesthesia Type:   General, ETT, Periph. Nerve Block for postop pain     Note:  Airway :Face Mask  Patient transferred to:PACU  Comments: Neuromuscular blockade reversed, TOF 4/4 with head lift sustained for 5 seconds, spontaneous respirations, followed commands, oropharynx suctioned with soft flexible catheter, extubated atraumatically with suction. Airway patent after extubation. Oxygen via FM at 6 LPM to PACU, connected to wall O2 in PACU. All monitors and alarms on and functioning. Report given to PACU RN and questions answered.         Vitals: (Last set prior to Anesthesia Care Transfer)    CRNA VITALS  4/3/2017 1427 - 4/3/2017 1501      4/3/2017             Pulse: 96    SpO2: 99 %    Resp Rate (observed): 8                Electronically Signed By: SHRADDHA Carl CRNA  April 3, 2017  3:01 PM

## 2017-04-03 NOTE — PROGRESS NOTES
Admission medication history interview status for the 4/3/2017  admission is complete. See EPIC admission navigator for prior to admission medications     Medication history source reliability:Good    Medication history interview source(s):Patient    Medication history resources (including written lists, pill bottles, clinic record):Patient mailed in her medication list prior to surgery    Primary pharmacy.[facility]    Additional medication history information not noted on PTA med list :None    Time spent in this activity: 40 minutes    Prior to Admission medications    Medication Sig Last Dose Taking? Auth Provider   Acetaminophen (TYLENOL ARTHRITIS PAIN PO) Take 1,300 mg by mouth 3 times daily 4/2/2017 at HS Yes Reported, Patient   Aspirin-Caffeine (THOMAS BACK & BODY) 500-32.5 MG TABS Take 2 tablets by mouth daily as needed 3/27/2017 at PRN Yes Reported, Patient   CALCIUM PO Take 1 tablet by mouth 2 times daily 4/2/2017 at PM Yes Reported, Patient   lisinopril-hydrochlorothiazide (PRINZIDE/ZESTORETIC) 20-12.5 MG per tablet Take 1 tablet by mouth daily 4/3/2017 at 0730 Yes Reported, Patient   Multiple Vitamins-Minerals (CENTRUM SILVER) per tablet Take 1 tablet by mouth daily 4/3/2017 at 0730 Yes Reported, Patient   Omega-3 Fatty Acids (FISH OIL PO) Take 1 capsule by mouth 2 times daily 3/27/2017 at AM Yes Reported, Patient   Atorvastatin Calcium (LIPITOR PO) Take 40 mg by mouth daily  4/3/2017 at 0730 Yes Reported, Patient   fluticasone-salmeterol (ADVAIR-HFA) 115-21 MCG/ACT inhaler Inhale 2 puffs into the lungs 2 times daily as needed  More than a Year at PRN Yes Reported, Patient   Cholecalciferol (VITAMIN D-3 PO) Take 1 tablet by mouth every evening  4/2/2017 at PM Yes Reported, Patient   citalopram (CELEXA) 20 MG tablet Take 20 mg by mouth every morning  4/3/2017 at 0730 Yes Reported, Patient   omeprazole (PRILOSEC) 20 MG capsule Take 20 mg by mouth every morning  4/3/2017 at 0730 Yes Reported, Patient    MeTHIMazole (TAPAZOLE) 5 MG tablet Take 5 mg by mouth daily  4/3/2017 at 0730 Yes Reported, Patient

## 2017-04-04 ENCOUNTER — APPOINTMENT (OUTPATIENT)
Dept: OCCUPATIONAL THERAPY | Facility: CLINIC | Age: 77
DRG: 483 | End: 2017-04-04
Attending: ORTHOPAEDIC SURGERY
Payer: MEDICARE

## 2017-04-04 VITALS
BODY MASS INDEX: 34.09 KG/M2 | OXYGEN SATURATION: 95 % | SYSTOLIC BLOOD PRESSURE: 120 MMHG | TEMPERATURE: 97.9 F | WEIGHT: 212.13 LBS | HEIGHT: 66 IN | RESPIRATION RATE: 16 BRPM | DIASTOLIC BLOOD PRESSURE: 64 MMHG | HEART RATE: 90 BPM

## 2017-04-04 LAB
GLUCOSE SERPL-MCNC: 250 MG/DL (ref 70–99)
HGB BLD-MCNC: 12.2 G/DL (ref 11.7–15.7)

## 2017-04-04 PROCEDURE — 36415 COLL VENOUS BLD VENIPUNCTURE: CPT | Performed by: ORTHOPAEDIC SURGERY

## 2017-04-04 PROCEDURE — 25000132 ZZH RX MED GY IP 250 OP 250 PS 637: Mod: GY | Performed by: INTERNAL MEDICINE

## 2017-04-04 PROCEDURE — 97165 OT EVAL LOW COMPLEX 30 MIN: CPT | Mod: GO | Performed by: OCCUPATIONAL THERAPIST

## 2017-04-04 PROCEDURE — 82947 ASSAY GLUCOSE BLOOD QUANT: CPT | Performed by: ORTHOPAEDIC SURGERY

## 2017-04-04 PROCEDURE — 97535 SELF CARE MNGMENT TRAINING: CPT | Mod: GO | Performed by: OCCUPATIONAL THERAPIST

## 2017-04-04 PROCEDURE — 97110 THERAPEUTIC EXERCISES: CPT | Mod: GO | Performed by: OCCUPATIONAL THERAPIST

## 2017-04-04 PROCEDURE — A9270 NON-COVERED ITEM OR SERVICE: HCPCS | Mod: GY | Performed by: INTERNAL MEDICINE

## 2017-04-04 PROCEDURE — 25000132 ZZH RX MED GY IP 250 OP 250 PS 637: Mod: GY | Performed by: ORTHOPAEDIC SURGERY

## 2017-04-04 PROCEDURE — 25000128 H RX IP 250 OP 636: Performed by: ORTHOPAEDIC SURGERY

## 2017-04-04 PROCEDURE — 40000133 ZZH STATISTIC OT WARD VISIT: Performed by: OCCUPATIONAL THERAPIST

## 2017-04-04 PROCEDURE — 85018 HEMOGLOBIN: CPT | Performed by: ORTHOPAEDIC SURGERY

## 2017-04-04 PROCEDURE — 97530 THERAPEUTIC ACTIVITIES: CPT | Mod: GO | Performed by: OCCUPATIONAL THERAPIST

## 2017-04-04 PROCEDURE — 25800025 ZZH RX 258: Performed by: ORTHOPAEDIC SURGERY

## 2017-04-04 PROCEDURE — A9270 NON-COVERED ITEM OR SERVICE: HCPCS | Mod: GY | Performed by: ORTHOPAEDIC SURGERY

## 2017-04-04 RX ORDER — AMOXICILLIN 250 MG
1-2 CAPSULE ORAL 2 TIMES DAILY
Qty: 50 TABLET | Refills: 0 | Status: ON HOLD | OUTPATIENT
Start: 2017-04-04 | End: 2020-07-16

## 2017-04-04 RX ORDER — OXYCODONE HYDROCHLORIDE 5 MG/1
5-10 TABLET ORAL EVERY 4 HOURS PRN
Qty: 50 TABLET | Refills: 0 | Status: ON HOLD | OUTPATIENT
Start: 2017-04-04 | End: 2020-07-16

## 2017-04-04 RX ORDER — POTASSIUM CHLORIDE 1.5 G/1.58G
20-40 POWDER, FOR SOLUTION ORAL
Status: DISCONTINUED | OUTPATIENT
Start: 2017-04-04 | End: 2017-04-04 | Stop reason: HOSPADM

## 2017-04-04 RX ORDER — POTASSIUM CHLORIDE 1500 MG/1
20-40 TABLET, EXTENDED RELEASE ORAL
Status: DISCONTINUED | OUTPATIENT
Start: 2017-04-04 | End: 2017-04-04 | Stop reason: HOSPADM

## 2017-04-04 RX ORDER — POTASSIUM CHLORIDE 29.8 MG/ML
20 INJECTION INTRAVENOUS
Status: DISCONTINUED | OUTPATIENT
Start: 2017-04-04 | End: 2017-04-04 | Stop reason: HOSPADM

## 2017-04-04 RX ORDER — POTASSIUM CHLORIDE 7.45 MG/ML
10 INJECTION INTRAVENOUS
Status: DISCONTINUED | OUTPATIENT
Start: 2017-04-04 | End: 2017-04-04 | Stop reason: HOSPADM

## 2017-04-04 RX ORDER — OXYCODONE HYDROCHLORIDE 5 MG/1
5-10 TABLET ORAL
Status: DISCONTINUED | OUTPATIENT
Start: 2017-04-04 | End: 2017-04-04 | Stop reason: HOSPADM

## 2017-04-04 RX ADMIN — CEFAZOLIN SODIUM 2 G: 2 INJECTION, SOLUTION INTRAVENOUS at 04:12

## 2017-04-04 RX ADMIN — OXYCODONE HYDROCHLORIDE 10 MG: 5 TABLET ORAL at 15:17

## 2017-04-04 RX ADMIN — ACETAMINOPHEN 975 MG: 325 TABLET, FILM COATED ORAL at 04:12

## 2017-04-04 RX ADMIN — SENNOSIDES AND DOCUSATE SODIUM 2 TABLET: 8.6; 5 TABLET ORAL at 08:44

## 2017-04-04 RX ADMIN — HYDROMORPHONE HYDROCHLORIDE 0.5 MG: 1 INJECTION, SOLUTION INTRAMUSCULAR; INTRAVENOUS; SUBCUTANEOUS at 00:59

## 2017-04-04 RX ADMIN — OXYCODONE HYDROCHLORIDE 10 MG: 5 TABLET ORAL at 02:43

## 2017-04-04 RX ADMIN — OXYCODONE HYDROCHLORIDE 10 MG: 5 TABLET ORAL at 11:13

## 2017-04-04 RX ADMIN — POTASSIUM CHLORIDE 20 MEQ: 1500 TABLET, EXTENDED RELEASE ORAL at 02:43

## 2017-04-04 RX ADMIN — OMEPRAZOLE 20 MG: 20 CAPSULE, DELAYED RELEASE ORAL at 08:45

## 2017-04-04 RX ADMIN — OXYCODONE HYDROCHLORIDE 10 MG: 5 TABLET ORAL at 06:54

## 2017-04-04 RX ADMIN — HYDROMORPHONE HYDROCHLORIDE 0.5 MG: 1 INJECTION, SOLUTION INTRAMUSCULAR; INTRAVENOUS; SUBCUTANEOUS at 04:07

## 2017-04-04 RX ADMIN — POTASSIUM CHLORIDE 40 MEQ: 1500 TABLET, EXTENDED RELEASE ORAL at 01:00

## 2017-04-04 RX ADMIN — HYDROMORPHONE HYDROCHLORIDE 0.5 MG: 1 INJECTION, SOLUTION INTRAMUSCULAR; INTRAVENOUS; SUBCUTANEOUS at 08:45

## 2017-04-04 RX ADMIN — CYCLOBENZAPRINE HYDROCHLORIDE 5 MG: 5 TABLET, FILM COATED ORAL at 06:09

## 2017-04-04 RX ADMIN — LISINOPRIL AND HYDROCHLOROTHIAZIDE 1 TABLET: 12.5; 2 TABLET ORAL at 08:45

## 2017-04-04 RX ADMIN — POTASSIUM CHLORIDE, DEXTROSE MONOHYDRATE AND SODIUM CHLORIDE: 150; 5; 450 INJECTION, SOLUTION INTRAVENOUS at 02:51

## 2017-04-04 RX ADMIN — ACETAMINOPHEN 975 MG: 325 TABLET, FILM COATED ORAL at 11:13

## 2017-04-04 RX ADMIN — ATORVASTATIN CALCIUM 40 MG: 40 TABLET, FILM COATED ORAL at 08:45

## 2017-04-04 RX ADMIN — HYDROMORPHONE HYDROCHLORIDE 0.5 MG: 1 INJECTION, SOLUTION INTRAMUSCULAR; INTRAVENOUS; SUBCUTANEOUS at 06:06

## 2017-04-04 NOTE — PLAN OF CARE
Problem: Goal Outcome Summary  Goal: Goal Outcome Summary  OT: physician orders received, eval completed, POC developed.      Surgeon Discharge Plan: Home      Current Functional Status: Pt completed UE dressing with min A, LE dressing with MI, pt able to gather items with good functional mobility. Pt required ed regarding AROM for R elbow, wrist, hand, and review of precautions. Pt participated in ed for sling management, demo'd understanding.      Barriers to Plan/Home: UE dressing, pain, reduced I in IADLs

## 2017-04-04 NOTE — PROGRESS NOTES
[facility] Orthopedic Post-Op / Progress Note  Ivania Rubi is a 76 year old female    Today's Date:2017  Admission Date: 4/3/2017  POD # 1 Reverse TSA         Interval History:   Struggling with pain after block wore off  Otherwise doing well.  CMS good            Physical Exam:   All vitals have been reviewed  Temperatures:  Current - Temp: 97.9  F (36.6  C); Max - Temp  Av.3  F (36.3  C)  Min: 95.7  F (35.4  C)  Max: 98  F (36.7  C)  Pulse range: Pulse  Av.1  Min: 70  Max: 101  Blood pressure range: Systolic (24hrs), Av , Min:91 , Max:139   ; Diastolic (24hrs), Av, Min:37, Max:79      Intake/Output Summary (Last 24 hours) at 17 0847  Last data filed at 17 0251   Gross per 24 hour   Intake             1240 ml   Output             1500 ml   Net             -260 ml       Dressing dry and intact.          Data:   All laboratory data related to this surgery reviewed      Lab Results   Component Value Date     2017    POTASSIUM 3.2 (L) 2017    CHLORIDE 106 2017    CO2 31 2017     (H) 2017     Lab Results   Component Value Date    HGB 12.2 2017    HGB 15.4 2017    HGB 15.8 (H) 2009     No results found for: PLT    All imaging studies related to this surgery reviewed         Assessment and Plan:    Assessment:  Doing well.  No immediate surgical complications identified.  No excessive bleeding    Plan:  Pain control measures  Discharge plan: Home tomorrow    Kevin Mc MD

## 2017-04-04 NOTE — PLAN OF CARE
Problem: Goal Outcome Summary  Goal: Goal Outcome Summary  PT: Orders received, per discussion with OT, no acute PT needs identified.  Will complete PT order.

## 2017-04-04 NOTE — PLAN OF CARE
Problem: Goal Outcome Summary  Goal: Goal Outcome Summary  Outcome: Improving  Patient up with SBA of 1.  Patient dangled; tolerated well and denied nausea or lightheadedness.  Patient denies pain.  VSS.  A/Ox4.  Dressing CDI.  +2 radial pulses.

## 2017-04-04 NOTE — DISCHARGE SUMMARY
DISCHARGE SUMMARY    NAME:  Ivania Rubi  AGE:  76 year old  YOB: 1940  MRN#:  [id number]    Ivania Rubi was admitted for elective reverse total shoulder arthroplasty.  The surgery was performed on 4/3/2017.  The postoperative course is documented in the medical record.  There were no complications. The patient was felt ready for discharge home with post-discharge physical therapy and outpatient visit prearranged.     Lab Results   Component Value Date    HGB 12.2 04/04/2017    HGB 15.4 04/03/2017    HGB 15.8 (H) 01/13/2009       The patient received 0 units transfusion.      FINAL DISCHARGE DIAGNOSIS:  Rotator cuff arthropathy               Acute blood loss anemia     SURGICAL PROCEDURE THIS ADMISSION:  Reverse right total shoulder arthroplasty.         ROCHELLE DURON MD      CC: Fax [phone]         Rochelle Duron MD

## 2017-04-04 NOTE — PROGRESS NOTES
04/04/17 0900   Quick Adds   Type of Visit Initial Occupational Therapy Evaluation   Living Environment   Lives With alone   Living Arrangements apartment   Home Accessibility no concerns   Number of Stairs to Enter Home 0   Number of Stairs Within Home 0   Stair Railings at Home none   Transportation Available family or friend will provide   Living Environment Comment [facility] [place], there is an elevator present, no steps to enter all one level, no AE present in home   Self-Care   Dominant Hand right   Functional Level Prior   Ambulation 0-->independent   Transferring 0-->independent   Toileting 0-->independent   Bathing 0-->independent   Dressing 0-->independent   Eating 0-->independent   Communication 0-->understands/communicates without difficulty   Swallowing 0-->swallows foods/liquids without difficulty   Cognition 0 - no cognition issues reported   General Information   Onset of Illness/Injury or Date of Surgery - Date 04/03/17   Referring Physician Dr. Kevin Mc   Patient/Family Goals Statement To be able to drive again to see son   Additional Occupational Profile Info/Pertinent History of Current Problem Pt underwent planned reverse total shoulder. Pt was I in all ADLs and IADLs prior to surgery. Pt has son with autism, but does not provide caregiving, although drives to see him weekly.   Precautions/Limitations other (see comments);fall precautions  (Reverse total shoulder precautions)   Weight-Bearing Status - RUE nonweight-bearing   Cognitive Status Examination   Orientation orientation to person, place and time   Level of Consciousness alert   Able to Follow Commands WNL/WFL   Cognitive Comment No concerns at this time   Pain Assessment   Patient Currently in Pain Yes, see Vital Sign flowsheet   Range of Motion (ROM)   ROM Comment Shoulder ROM not tested, AROM okay for hand, wrist, elbow   Strength   Manual Muscle Testing Quick Adds Other  (LUE no deficits identified, RUE no tested)    Mobility   Bed Mobility Bed mobility analysis   Bed Mobility Comments SBA   Transfer Skills   Transfer Transfer Skill: Stand to Sit   Transfer Comments Mod I   Transfer Skill: Bed to Chair/Chair to Bed   Level of Slope: Bed to Chair stand-by assist   Transfer Skill: Sit to Stand   Level of Slope: Sit/Stand stand-by assist   Transfer Skill: Toilet Transfer   Level of Slope: Toilet stand-by assist   Upper Body Dressing   Level of Slope: Dress Upper Body moderate assist (50% patients effort)   Lower Body Dressing   Level of Slope: Dress Lower Body minimum assist (75% patients effort)   Toileting   Level of Slope: Toilet stand-by assist   Grooming   Level of Slope: Grooming independent   Eating/Self Feeding   Level of Slope: Eating independent   Activities of Daily Living Analysis   Impairments Contributing to Impaired Activities of Daily Living fear and anxiety;pain;post surgical precautions   General Therapy Interventions   Planned Therapy Interventions ADL retraining   Clinical Impression   Criteria for Skilled Therapeutic Interventions Met yes, treatment indicated   OT Diagnosis Decreased ADLs   Influenced by the following impairments pain, post surgical precautions, decreased coordination for use of non-dominant UE   Assessment of Occupational Performance 1-3 Performance Deficits   Identified Performance Deficits pain, surgical precautions, ROM   Clinical Decision Making (Complexity) Low complexity   Therapy Frequency (2 times)   Predicted Duration of Therapy Intervention (days/wks) 1 day   Anticipated Discharge Disposition Other (see comments);Home with Assist  (Friend will provide assist)   Risks and Benefits of Treatment have been explained. Yes   Patient, Family & other staff in agreement with plan of care Yes   Clinical Impression Comments Pt presents with reduced I in ADLs following reverse total shoulder, pt shows high pain levels, surgical  "precautions contributing to decreased I.    [facility] AM-PAC  \"6 Clicks\" Daily Activity Inpatient Short Form   1. Putting on and taking off regular lower body clothing? 4 - None   2. Bathing (including washing, rinsing, drying)? 3 - A Little   3. Toileting, which includes using toilet, bedpan or urinal? 4 - None   4. Putting on and taking off regular upper body clothing? 3 - A Little   5. Taking care of personal grooming such as brushing teeth? 4 - None   6. Eating meals? 4 - None   Daily Activity Raw Score (Score out of 24.Lower scores equate to lower levels of function) 22   Total Evaluation Time   Total Evaluation Time (Minutes) 10     "

## 2017-04-04 NOTE — PROGRESS NOTES
VSS, CMS intact. Up with 1 assist. Pain well controlled with oxycodone. Reviewed AVS with patient. Answered all the questions. No concerns at this time. Discharge medications and instruction sheets given. Alert and orient x 4.

## 2017-04-04 NOTE — DISCHARGE INSTRUCTIONS
DISCHARGE INSTRUCTIONS FOR YOUR TOTAL SHOULDER REPLACEMENT      ROCHELLE DURON MD    Instructions to care for your wound at home:   Change the dressing daily.  Inspect your incision at the time of dressing change for redness, swelling, and drainage.  Some discoloration and bruising is common, but please notify my office if you have any concerns about the wound appearance.  You may shower directly over the wound beginning 4 days following your surgery.  Do not, however, submerge the wound under water until the sutures are removed and the wound completely sealed and without drainage.  You should let your arm hang at your side during the shower.  Do not actively lift or actively move the arm from your side when out of the sling.  To wash under your arm, simply lean over towards your operative shoulder to allow the arm to hang freely and gently separate from your body by gravity alone.         Assistive devices:  Sling for support.  Remove the sling at least 3 times daily to fully straighten your elbow.  Also move your wrist, hand, and fingers often to mobilize swelling and prevent stiffness.    Wearing a button-up blouse or shirt is recommended.  The shoulder sling or immobilizer may be worn over the outside of your clothes.  Always keep your surgical arm hanging at your side when donning or doffing the sling.  Slide your surgical arm into the shirt sleeve first, then the other arm.  When taking off the shirt or blouse, do the opposite - slide your unaffected arm out of the sleeve first, followed by the surgical arm.     Outpatient physical therapy and home exercises:  Your outpatient physical therapy following your surgery is prearranged by our office.  You should have already scheduled your therapy sessions in advance.  If you have not done so, contact the therapy location of your choice for your appointments for the physical therapy regimen that has been prescribed for you.  You should perform your home exercise  program daily in addition to the sessions scheduled with your therapist.    Clinic follow-up appointment:  Your clinic follow-up visit has been prearranged at the time of your surgery scheduling.     Medications:  New discharge medications will include a pain medication and a stool softener while on the narcotic pain medication. Detailed instructions will come with those medications.  You will also receive instructions on when to resume your home medications.    Please use Aspirin 325 mg, 1 tablet daily for the next 6 weeks to prevent any blood clots from forming.    Antibiotic coverage will be needed before any type of dental procedure.  This is a life long recommendation.  You should notify your dentist of your total shoulder surgery and call your dentist or our office one week before a dental appointment for the antibiotics.       Call [phone] with any questions.     Kevin Mc MD

## 2017-04-04 NOTE — PLAN OF CARE
Problem: Goal Outcome Summary  Goal: Goal Outcome Summary  Occupational Therapy Discharge Summary     Reason for therapy discharge:    Discharged to home.     Progress towards therapy goal(s). See goals on Care Plan in [facility] electronic health record for goal details.  Goals not met.  Barriers to achieving goals:   discharge from facility.     Therapy recommendation(s):    No further therapy is recommended.  OP as ordered by MD.

## 2017-04-04 NOTE — PLAN OF CARE
Problem: Goal Outcome Summary  Goal: Goal Outcome Summary  Outcome: Improving  Patient is A&O, VSS on 4L NC, CMS intact, numbness on fingers resolved. Regular diet, Hemovac patent, low K+ treated, dressing CDI, standby-assist. Denies pain all evening, but pain got worsen in the middle of the night when the block weaned off. IV dilaudid, Oxycodone, and Flexeril given for pain control. Will continue to monitor

## 2017-07-17 ENCOUNTER — APPOINTMENT (OUTPATIENT)
Age: 77
Setting detail: DERMATOLOGY
End: 2017-07-31

## 2017-07-17 DIAGNOSIS — L57.0 ACTINIC KERATOSIS: ICD-10-CM

## 2017-07-17 DIAGNOSIS — L82.1 OTHER SEBORRHEIC KERATOSIS: ICD-10-CM

## 2017-07-17 DIAGNOSIS — L81.4 OTHER MELANIN HYPERPIGMENTATION: ICD-10-CM

## 2017-07-17 DIAGNOSIS — D18.0 HEMANGIOMA: ICD-10-CM

## 2017-07-17 DIAGNOSIS — Z85.828 PERSONAL HISTORY OF OTHER MALIGNANT NEOPLASM OF SKIN: ICD-10-CM

## 2017-07-17 DIAGNOSIS — D22 MELANOCYTIC NEVI: ICD-10-CM

## 2017-07-17 PROBLEM — D48.5 NEOPLASM OF UNCERTAIN BEHAVIOR OF SKIN: Status: ACTIVE | Noted: 2017-07-17

## 2017-07-17 PROBLEM — D22.5 MELANOCYTIC NEVI OF TRUNK: Status: ACTIVE | Noted: 2017-07-17

## 2017-07-17 PROBLEM — D18.01 HEMANGIOMA OF SKIN AND SUBCUTANEOUS TISSUE: Status: ACTIVE | Noted: 2017-07-17

## 2017-07-17 PROCEDURE — OTHER COUNSELING: OTHER

## 2017-07-17 PROCEDURE — OTHER BIOPSY BY SHAVE METHOD: OTHER

## 2017-07-17 PROCEDURE — 99203 OFFICE O/P NEW LOW 30 MIN: CPT | Mod: 25

## 2017-07-17 PROCEDURE — 17003 DESTRUCT PREMALG LES 2-14: CPT

## 2017-07-17 PROCEDURE — OTHER MIPS QUALITY: OTHER

## 2017-07-17 PROCEDURE — OTHER LIQUID NITROGEN: OTHER

## 2017-07-17 PROCEDURE — 11100: CPT | Mod: 59

## 2017-07-17 PROCEDURE — 17000 DESTRUCT PREMALG LESION: CPT

## 2017-07-17 ASSESSMENT — LOCATION DETAILED DESCRIPTION DERM
LOCATION DETAILED: LEFT SUPERIOR MEDIAL UPPER BACK
LOCATION DETAILED: RIGHT SUPERIOR MEDIAL UPPER BACK
LOCATION DETAILED: RIGHT PROXIMAL DORSAL FOREARM
LOCATION DETAILED: LEFT LATERAL EYEBROW
LOCATION DETAILED: STERNAL NOTCH
LOCATION DETAILED: RIGHT MID-UPPER BACK

## 2017-07-17 ASSESSMENT — LOCATION SIMPLE DESCRIPTION DERM
LOCATION SIMPLE: RIGHT FOREARM
LOCATION SIMPLE: LEFT EYEBROW
LOCATION SIMPLE: CHEST
LOCATION SIMPLE: RIGHT UPPER BACK
LOCATION SIMPLE: LEFT UPPER BACK

## 2017-07-17 ASSESSMENT — LOCATION ZONE DERM
LOCATION ZONE: FACE
LOCATION ZONE: ARM
LOCATION ZONE: TRUNK

## 2017-07-17 NOTE — PROCEDURE: LIQUID NITROGEN
Consent: The patient's consent was obtained including but not limited to risks of crusting, scabbing, blistering, scarring, darker or lighter pigmentary change, recurrence, incomplete removal and infection.
Total Number Of Aks Treated: 4
Render Post-Care Instructions In Note?: yes
Detail Level: Zone
Post-Care Instructions: I reviewed with the patient in detail post-care instructions. (Written instructions given) Patient is to wear sun protection, and avoid picking at any of the treated lesions. Pt may apply Vaseline to crusted or scabbing areas.
Number Of Freeze-Thaw Cycles: 1 freeze-thaw cycle
Duration Of Freeze Thaw-Cycle (Seconds): 10

## 2017-07-17 NOTE — HPI: SKIN CHECK
What Is The Reason For Today's Visit?: the risk of recurrence, and the development of new lesions
Additional History: She has multiple pigmented lesions distributed throughout the body she would like checked today. These are asymptomatic, mild in severity, present for years and have not been treated.

## 2017-07-17 NOTE — PROCEDURE: MIPS QUALITY
Quality 226: Preventive Care And Screening: Tobacco Use: Screening And Cessation Intervention: Patient screened for tobacco and never smoked
Detail Level: Detailed
Quality 431: Preventive Care And Screening: Unhealthy Alcohol Use - Screening: Patient screened for unhealthy alcohol use using a single question and scores less than 2 times per year
Quality 265: Biopsy Follow-Up: Biopsy results reviewed, communicated, tracked, and documented
Quality 130: Documentation Of Current Medications In The Medical Record: Current Medications Documented

## 2017-07-17 NOTE — PROCEDURE: BIOPSY BY SHAVE METHOD
Size Of Lesion In Cm: 0.4
Bill For Surgical Tray: no
Anesthesia Type: 1% lidocaine with epinephrine and a 1:10 solution of 8.4% sodium bicarbonate
Detail Level: Detailed
Biopsy Method: Double edge Personna blades
Electrodesiccation And Curettage Text: The wound bed was treated with electrodesiccation and curettage after the biopsy was performed.
Notification Instructions: Patient will be notified of biopsy results. However, patient instructed to call the office if not contacted within 2 weeks.
Type Of Destruction Used: Electrodesiccation
Biopsy Type: H and E
Wound Care: Vaseline
Body Location Override (Optional - Billing Will Still Be Based On Selected Body Map Location If Applicable): right dorsal forearm
Cryotherapy Text: The wound bed was treated with cryotherapy after the biopsy was performed.
Dressing: bandage
Curettage Text: The wound bed was treated with curettage after the biopsy was performed.
Post-Care Instructions: I reviewed with the patient in detail post-care instructions. Patient is to keep the biopsy site dry overnight, and then apply H2O2, Vaseline and a bandage daily until healed.
Anesthesia Volume In Cc (Will Not Render If 0): 0.1
Render Post-Care Instructions In Note?: yes
Billing Type: Third-Party Bill
Silver Nitrate Text: The wound bed was treated with silver nitrate after the biopsy was performed.
Consent: Written consent was obtained and risks were reviewed including but not limited to scarring, infection, bleeding, scabbing, incomplete removal, nerve damage and allergy to anesthesia.
Additional Anesthesia Volume In Cc (Will Not Render If 0): 0
Electrodesiccation Text: The wound bed was treated with electrodesiccation after the biopsy was performed.
Hemostasis: Drysol

## 2017-09-20 ENCOUNTER — APPOINTMENT (OUTPATIENT)
Age: 77
Setting detail: DERMATOLOGY
End: 2017-10-01

## 2017-09-20 DIAGNOSIS — L21.8 OTHER SEBORRHEIC DERMATITIS: ICD-10-CM

## 2017-09-20 DIAGNOSIS — L57.0 ACTINIC KERATOSIS: ICD-10-CM

## 2017-09-20 DIAGNOSIS — L82.0 INFLAMED SEBORRHEIC KERATOSIS: ICD-10-CM

## 2017-09-20 PROCEDURE — OTHER COUNSELING: OTHER

## 2017-09-20 PROCEDURE — 99212 OFFICE O/P EST SF 10 MIN: CPT | Mod: 25

## 2017-09-20 PROCEDURE — OTHER LIQUID NITROGEN: OTHER

## 2017-09-20 PROCEDURE — 17000 DESTRUCT PREMALG LESION: CPT | Mod: 59

## 2017-09-20 PROCEDURE — OTHER MIPS QUALITY: OTHER

## 2017-09-20 PROCEDURE — 17110 DESTRUCT B9 LESION 1-14: CPT

## 2017-09-20 ASSESSMENT — LOCATION SIMPLE DESCRIPTION DERM
LOCATION SIMPLE: RIGHT FOREARM
LOCATION SIMPLE: LEFT EAR

## 2017-09-20 ASSESSMENT — LOCATION ZONE DERM
LOCATION ZONE: EAR
LOCATION ZONE: ARM

## 2017-09-20 ASSESSMENT — LOCATION DETAILED DESCRIPTION DERM
LOCATION DETAILED: LEFT CAVUM CONCHA
LOCATION DETAILED: RIGHT PROXIMAL DORSAL FOREARM

## 2017-09-20 NOTE — PROCEDURE: LIQUID NITROGEN
Post-Care Instructions: I reviewed with the patient in detail post-care instructions. Patient is to wear sunprotection, and avoid picking at any of the treated lesions. Pt may apply Vaseline to crusted or scabbing areas.
Medical Necessity Clause: This procedure was medically necessary because the lesions that were treated were:
Render Post-Care Instructions In Note?: yes
Detail Level: Detailed
Add 52 Modifier (Optional): no
Number Of Freeze-Thaw Cycles: 1 freeze-thaw cycle
Duration Of Freeze Thaw-Cycle (Seconds): 15
Medical Necessity Information: It is in your best interest to select a reason for this procedure from the list below. All of these items fulfill various CMS LCD requirements except the new and changing color options.
Duration Of Freeze Thaw-Cycle (Seconds): 15-20
Consent: The patient's consent was obtained including but not limited to risks of crusting, scabbing, blistering, scarring, darker or lighter pigmentary change, recurrence, incomplete removal and infection.

## 2017-09-20 NOTE — PROCEDURE: MIPS QUALITY
Detail Level: Detailed
Quality 265: Biopsy Follow-Up: Biopsy results reviewed, communicated, tracked, and documented
Quality 130: Documentation Of Current Medications In The Medical Record: Current Medications Documented
Quality 431: Preventive Care And Screening: Unhealthy Alcohol Use - Screening: Patient screened for unhealthy alcohol use using a single question and scores less than 2 times per year
Quality 226: Preventive Care And Screening: Tobacco Use: Screening And Cessation Intervention: Patient screened for tobacco and never smoked

## 2017-10-25 ENCOUNTER — ANESTHESIA (OUTPATIENT)
Dept: SURGERY | Facility: CLINIC | Age: 77
End: 2017-10-25
Payer: MEDICARE

## 2017-10-25 ENCOUNTER — ANESTHESIA EVENT (OUTPATIENT)
Dept: SURGERY | Facility: CLINIC | Age: 77
End: 2017-10-25
Payer: MEDICARE

## 2017-10-25 ENCOUNTER — HOSPITAL ENCOUNTER (OUTPATIENT)
Facility: CLINIC | Age: 77
Discharge: HOME | End: 2017-10-25
Attending: OPHTHALMOLOGY | Admitting: OPHTHALMOLOGY
Payer: MEDICARE

## 2017-10-25 ENCOUNTER — SURGERY (OUTPATIENT)
Age: 77
End: 2017-10-25

## 2017-10-25 VITALS
SYSTOLIC BLOOD PRESSURE: 132 MMHG | BODY MASS INDEX: 32.3 KG/M2 | RESPIRATION RATE: 16 BRPM | OXYGEN SATURATION: 97 % | WEIGHT: 201 LBS | HEIGHT: 66 IN | DIASTOLIC BLOOD PRESSURE: 87 MMHG | TEMPERATURE: 97.1 F

## 2017-10-25 PROCEDURE — 25000128 H RX IP 250 OP 636: Performed by: NURSE ANESTHETIST, CERTIFIED REGISTERED

## 2017-10-25 PROCEDURE — S0020 INJECTION, BUPIVICAINE HYDRO: HCPCS | Performed by: OPHTHALMOLOGY

## 2017-10-25 PROCEDURE — 27210794 ZZH OR GENERAL SUPPLY STERILE: Performed by: OPHTHALMOLOGY

## 2017-10-25 PROCEDURE — 71000028 ZZH EYE RECOVERY PHASE 2 EACH 15 MINS: Performed by: OPHTHALMOLOGY

## 2017-10-25 PROCEDURE — 27210995 ZZH RX 272: Performed by: OPHTHALMOLOGY

## 2017-10-25 PROCEDURE — 36000104 ZZH EYE SURGERY LEVEL 4 1ST 30 MIN: Performed by: OPHTHALMOLOGY

## 2017-10-25 PROCEDURE — 25000125 ZZHC RX 250: Performed by: OPHTHALMOLOGY

## 2017-10-25 PROCEDURE — 25000128 H RX IP 250 OP 636: Performed by: OPHTHALMOLOGY

## 2017-10-25 PROCEDURE — 25000125 ZZHC RX 250: Performed by: NURSE ANESTHETIST, CERTIFIED REGISTERED

## 2017-10-25 PROCEDURE — 37000008 ZZH ANESTHESIA TECHNICAL FEE, 1ST 30 MIN: Performed by: OPHTHALMOLOGY

## 2017-10-25 PROCEDURE — 25000125 ZZHC RX 250: Performed by: ANESTHESIOLOGY

## 2017-10-25 PROCEDURE — 25000128 H RX IP 250 OP 636: Performed by: ANESTHESIOLOGY

## 2017-10-25 PROCEDURE — 37000009 ZZH ANESTHESIA TECHNICAL FEE, EACH ADDTL 15 MIN: Performed by: OPHTHALMOLOGY

## 2017-10-25 PROCEDURE — 40000170 ZZH STATISTIC PRE-PROCEDURE ASSESSMENT II: Performed by: OPHTHALMOLOGY

## 2017-10-25 RX ORDER — CYCLOPENTOLATE HYDROCHLORIDE 10 MG/ML
1 SOLUTION/ DROPS OPHTHALMIC
Status: COMPLETED | OUTPATIENT
Start: 2017-10-25 | End: 2017-10-25

## 2017-10-25 RX ORDER — SODIUM CHLORIDE, SODIUM LACTATE, POTASSIUM CHLORIDE, CALCIUM CHLORIDE 600; 310; 30; 20 MG/100ML; MG/100ML; MG/100ML; MG/100ML
INJECTION, SOLUTION INTRAVENOUS CONTINUOUS
Status: DISCONTINUED | OUTPATIENT
Start: 2017-10-25 | End: 2017-10-25 | Stop reason: HOSPADM

## 2017-10-25 RX ORDER — ONDANSETRON 2 MG/ML
INJECTION INTRAMUSCULAR; INTRAVENOUS PRN
Status: DISCONTINUED | OUTPATIENT
Start: 2017-10-25 | End: 2017-10-25

## 2017-10-25 RX ORDER — PROPOFOL 10 MG/ML
INJECTION, EMULSION INTRAVENOUS PRN
Status: DISCONTINUED | OUTPATIENT
Start: 2017-10-25 | End: 2017-10-25

## 2017-10-25 RX ORDER — ATROPINE SULFATE 10 MG/ML
SOLUTION/ DROPS OPHTHALMIC PRN
Status: DISCONTINUED | OUTPATIENT
Start: 2017-10-25 | End: 2017-10-25 | Stop reason: HOSPADM

## 2017-10-25 RX ORDER — LIDOCAINE HYDROCHLORIDE 20 MG/ML
INJECTION, SOLUTION INFILTRATION; PERINEURAL PRN
Status: DISCONTINUED | OUTPATIENT
Start: 2017-10-25 | End: 2017-10-25

## 2017-10-25 RX ORDER — BALANCED SALT SOLUTION 6.4; .75; .48; .3; 3.9; 1.7 MG/ML; MG/ML; MG/ML; MG/ML; MG/ML; MG/ML
SOLUTION OPHTHALMIC PRN
Status: DISCONTINUED | OUTPATIENT
Start: 2017-10-25 | End: 2017-10-25 | Stop reason: HOSPADM

## 2017-10-25 RX ORDER — DEXAMETHASONE SODIUM PHOSPHATE 10 MG/ML
INJECTION, SOLUTION INTRAMUSCULAR; INTRAVENOUS PRN
Status: DISCONTINUED | OUTPATIENT
Start: 2017-10-25 | End: 2017-10-25 | Stop reason: HOSPADM

## 2017-10-25 RX ORDER — LIDOCAINE 40 MG/G
CREAM TOPICAL
Status: DISCONTINUED | OUTPATIENT
Start: 2017-10-25 | End: 2017-10-25 | Stop reason: HOSPADM

## 2017-10-25 RX ORDER — PHENYLEPHRINE HYDROCHLORIDE 25 MG/ML
1 SOLUTION/ DROPS OPHTHALMIC
Status: COMPLETED | OUTPATIENT
Start: 2017-10-25 | End: 2017-10-25

## 2017-10-25 RX ADMIN — BALANCED SALT SOLUTION 0.5 ML: 6.4; .75; .48; .3; 3.9; 1.7 SOLUTION OPHTHALMIC at 08:26

## 2017-10-25 RX ADMIN — PHENYLEPHRINE HYDROCHLORIDE 1 DROP: 2.5 SOLUTION/ DROPS OPHTHALMIC at 07:22

## 2017-10-25 RX ADMIN — CYCLOPENTOLATE HYDROCHLORIDE 1 DROP: 10 SOLUTION/ DROPS OPHTHALMIC at 07:32

## 2017-10-25 RX ADMIN — LIDOCAINE HYDROCHLORIDE 1 ML: 10 INJECTION, SOLUTION EPIDURAL; INFILTRATION; INTRACAUDAL; PERINEURAL at 07:30

## 2017-10-25 RX ADMIN — HYPROMELLOSE 2910 (4000 MPA.S) 2 DROP: 25 SOLUTION/ DROPS OPHTHALMIC at 08:15

## 2017-10-25 RX ADMIN — PHENYLEPHRINE HYDROCHLORIDE 1 DROP: 2.5 SOLUTION/ DROPS OPHTHALMIC at 07:30

## 2017-10-25 RX ADMIN — LIDOCAINE HYDROCHLORIDE 6 ML: 20 INJECTION, SOLUTION EPIDURAL; INFILTRATION; INTRACAUDAL; PERINEURAL at 08:00

## 2017-10-25 RX ADMIN — ONDANSETRON 4 MG: 2 INJECTION INTRAMUSCULAR; INTRAVENOUS at 08:05

## 2017-10-25 RX ADMIN — SODIUM CHLORIDE, POTASSIUM CHLORIDE, SODIUM LACTATE AND CALCIUM CHLORIDE: 600; 310; 30; 20 INJECTION, SOLUTION INTRAVENOUS at 08:00

## 2017-10-25 RX ADMIN — CYCLOPENTOLATE HYDROCHLORIDE 1 DROP: 10 SOLUTION/ DROPS OPHTHALMIC at 07:22

## 2017-10-25 RX ADMIN — PHENYLEPHRINE HYDROCHLORIDE 1 DROP: 2.5 SOLUTION/ DROPS OPHTHALMIC at 07:32

## 2017-10-25 RX ADMIN — DEXAMETHASONE SODIUM PHOSPHATE 5 MG: 10 INJECTION, SOLUTION INTRAMUSCULAR; INTRAVENOUS at 08:26

## 2017-10-25 RX ADMIN — MIDAZOLAM HYDROCHLORIDE 1 MG: 1 INJECTION, SOLUTION INTRAMUSCULAR; INTRAVENOUS at 08:05

## 2017-10-25 RX ADMIN — WATER 0.5 ML: 1 INJECTION INTRAMUSCULAR; INTRAVENOUS; SUBCUTANEOUS at 08:25

## 2017-10-25 RX ADMIN — MIDAZOLAM HYDROCHLORIDE 1 MG: 1 INJECTION, SOLUTION INTRAMUSCULAR; INTRAVENOUS at 07:57

## 2017-10-25 RX ADMIN — SODIUM CHLORIDE, POTASSIUM CHLORIDE, SODIUM LACTATE AND CALCIUM CHLORIDE: 600; 310; 30; 20 INJECTION, SOLUTION INTRAVENOUS at 07:30

## 2017-10-25 RX ADMIN — ATROPINE SULFATE 1 DROP: 10 SOLUTION OPHTHALMIC at 08:26

## 2017-10-25 RX ADMIN — CYCLOPENTOLATE HYDROCHLORIDE 1 DROP: 10 SOLUTION/ DROPS OPHTHALMIC at 07:30

## 2017-10-25 RX ADMIN — EPINEPHRINE 500 ML: 1 INJECTION, SOLUTION, CONCENTRATE INTRAVENOUS at 08:15

## 2017-10-25 RX ADMIN — BALANCED SALT SOLUTION 15 ML: 6.4; .75; .48; .3; 3.9; 1.7 SOLUTION OPHTHALMIC at 08:14

## 2017-10-25 RX ADMIN — PROPOFOL 50 MG: 10 INJECTION, EMULSION INTRAVENOUS at 07:57

## 2017-10-25 RX ADMIN — LIDOCAINE HYDROCHLORIDE 60 MG: 20 INJECTION, SOLUTION INFILTRATION; PERINEURAL at 07:57

## 2017-10-25 ASSESSMENT — COPD QUESTIONNAIRES: COPD: 0

## 2017-10-25 ASSESSMENT — ENCOUNTER SYMPTOMS
SEIZURES: 0
DYSRHYTHMIAS: 0

## 2017-10-25 ASSESSMENT — LIFESTYLE VARIABLES: TOBACCO_USE: 0

## 2017-10-25 NOTE — ANESTHESIA CARE TRANSFER NOTE
Patient: Ivania Rubi    Procedure(s):  LEFT EYE VITRECTOMY PARSPLANA WITH 25 GAUGE SYSTEM ; MEMBRANE PEEL ; AIR FLUID EXCHANGE - Wound Class: I-Clean    Diagnosis: EPIRETINAL MEMBRANE   Diagnosis Additional Information: No value filed.    Anesthesia Type:   MAC     Note:  Airway :Room Air  Patient transferred to:PACU ([facility])  Comments: [facility] in recliner with armrests up, spontaneous respirations, O2 saturation maintained greater than 95% with oxygen via room air. All monitors and alarms on and functioning, clinically stable vital signs. Report given to recovery RN and questions answered. Patient alert and following verbal directions.Handoff Report: Identifed the Patient, Identified the Reponsible Provider, Reviewed the pertinent medical history, Discussed the surgical course, Reviewed Intra-OP anesthesia mangement and issues during anesthesia, Set expectations for post-procedure period and Allowed opportunity for questions and acknowledgement of understanding      Vitals: (Last set prior to Anesthesia Care Transfer)    CRNA VITALS  10/25/2017 0756 - 10/25/2017 0831      10/25/2017             Pulse: 60    Ht Rate: 60    SpO2: 100 %    Resp Rate (set): 10                Electronically Signed By: SHRADDHA Byrne CRNA  October 25, 2017  8:31 AM

## 2017-10-25 NOTE — ANESTHESIA PREPROCEDURE EVALUATION
Anesthesia Evaluation     . Pt has had prior anesthetic.     History of anesthetic complications   - PONV        ROS/MED HX    ENT/Pulmonary:     (+)RASTA risk factors hypertension, obese, Intermittent asthma , . .   (-) tobacco use, COPD and sleep apnea   Neurologic:      (-) seizures, CVA and migraines   Cardiovascular:     (+) Dyslipidemia, hypertension----. : . . . :. .      (-) CAD, CASTILLO and arrhythmias   METS/Exercise Tolerance:     Hematologic:        (-) history of blood clots, anemia and other hematologic disorder   Musculoskeletal:        (-) arthritis   GI/Hepatic:     (+) GERD Asymptomatic on medication,      (-) liver disease   Renal/Genitourinary:      (-) renal disease   Endo:     (+) thyroid problem Obesity, .   (-) Type I DM and Type II DM   Psychiatric:         Infectious Disease:        (-) Recent Fever   Malignancy:         Other:                     Physical Exam  Normal systems: cardiovascular, pulmonary and dental    Airway   Mallampati: II  TM distance: >3 FB  Neck ROM: full    Dental     Cardiovascular   Rhythm and rate: regular and normal  (-) no murmur    Pulmonary                         Anesthesia Plan      History & Physical Review  History and physical reviewed and following examination; no interval change.    ASA Status:  2 .    NPO Status:  > 8 hours    Plan for MAC Reason for MAC:  Procedure to face, neck, head or breast         Postoperative Care      Consents  Anesthetic plan, risks, benefits and alternatives discussed with:  Patient..        Procedure: Procedure(s):  VITRECTOMY PARSPLANA WITH 25 GAUGE SYSTEM  Preop diagnosis: EPIRETINAL MEMBRANE     No Known Allergies  Past Medical History:   Diagnosis Date     Allergic rhinitis      Anemia      Arthritis     osteo arthritis     Depressive disorder, not elsewhere classified      Diarrhea      Displacement of cervical intervertebral disc without myelopathy      Gastroesophageal reflux disease      Hypotension, unspecified       Hypovitaminosis D      Nontoxic multinodular goiter      Obesity      Obesity      PONV (postoperative nausea and vomiting)      Pure hypercholesterolemia      Rosacea      Spinal stenosis, lumbar region, without neurogenic claudication      Spinal stenosis, lumbar region, without neurogenic claudication      Symptomatic menopausal or female climacteric states      Uncomplicated asthma      Unspecified asthma(493.90)      Unspecified essential hypertension      Past Surgical History:   Procedure Laterality Date     ARTHRODESIS FOOT       BACK SURGERY      cervical spinal fusion     BIOPSY      Thyroid     C LUMBAR SPINE FUSION,ANTER APPRCH       CARPAL TUNNEL RELEASE RT/LT       ENT SURGERY      T and A     GYN SURGERY      abdominal hysterectomy     ORTHOPEDIC SURGERY      tka right and left     ORTHOPEDIC SURGERY      knee scopes     ORTHOPEDIC SURGERY      bunionectomy rt and left     PHACOEMULSIFICATION CLEAR CORNEA WITH STANDARD INTRAOCULAR LENS IMPLANT  2/5/2014    Procedure: PHACOEMULSIFICATION CLEAR CORNEA WITH STANDARD INTRAOCULAR LENS IMPLANT;  RIGHT PHACOEMULSIFICATION CLEAR CORNEA WITH STANDARD INTRAOCULAR LENS IMPLANT ;  Surgeon: Marciano Zavala MD;  Location:  [facility]     PHACOEMULSIFICATION CLEAR CORNEA WITH STANDARD INTRAOCULAR LENS IMPLANT  2/12/2014    Procedure: PHACOEMULSIFICATION CLEAR CORNEA WITH STANDARD INTRAOCULAR LENS IMPLANT;  LEFT PHACOEMULSIFICATION CLEAR CORNEA WITH STANDARD INTRAOCULAR LENS IMPLANT ;  Surgeon: Marciano Zavala MD;  Location:  [facility]     RELEASE TRIGGER FINGER       REVERSE ARTHROPLASTY SHOULDER Right 4/3/2017    Procedure: REVERSE ARTHROPLASTY SHOULDER;  Surgeon: Kevin Mc MD;  Location:  OR     Prior to Admission medications    Medication Sig Start Date End Date Taking? Authorizing Provider   aspirin 81 MG EC tablet Take 81 mg by mouth daily   Yes Reported, Patient   Acetaminophen (TYLENOL ARTHRITIS PAIN PO) Take 1,300 mg by mouth 3 times daily   Yes  Reported, Patient   Aspirin-Caffeine (THOMAS BACK & BODY) 500-32.5 MG TABS Take 2 tablets by mouth daily as needed   Yes Reported, Patient   CALCIUM PO Take 1 tablet by mouth 2 times daily   Yes Reported, Patient   lisinopril-hydrochlorothiazide (PRINZIDE/ZESTORETIC) 20-12.5 MG per tablet Take 1 tablet by mouth daily   Yes Reported, Patient   Multiple Vitamins-Minerals (CENTRUM SILVER) per tablet Take 1 tablet by mouth daily   Yes Reported, Patient   Omega-3 Fatty Acids (FISH OIL PO) Take 1 capsule by mouth 2 times daily   Yes Reported, Patient   Atorvastatin Calcium (LIPITOR PO) Take 40 mg by mouth daily    Yes Reported, Patient   Cholecalciferol (VITAMIN D-3 PO) Take 1 tablet by mouth every evening    Yes Reported, Patient   citalopram (CELEXA) 20 MG tablet Take 20 mg by mouth every morning    Yes Reported, Patient   omeprazole (PRILOSEC) 20 MG capsule Take 20 mg by mouth every morning    Yes Reported, Patient   MeTHIMazole (TAPAZOLE) 5 MG tablet Take 5 mg by mouth daily    Yes Reported, Patient   oxyCODONE (ROXICODONE) 5 MG IR tablet Take 1-2 tablets (5-10 mg) by mouth every 4 hours as needed for moderate to severe pain 4/4/17   Kevin Mc MD   senna-docusate (SENOKOT-S;PERICOLACE) 8.6-50 MG per tablet Take 1-2 tablets by mouth 2 times daily 4/4/17   Kevin Mc MD     Current Facility-Administered Medications Ordered in Epic   Medication Dose Route Frequency Last Rate Last Dose     lactated ringers infusion   Intravenous Continuous 10 mL/hr at 10/25/17 0730       bupivacaine 0.75% (pf) 4.5mL + lidocaine 2% (pf) 4.5mL + hyaluronidase (HYLENEX) 150 units   Retrobulbar Once         povidone-iodine 5 % ophthalmic solution 1 drop  1 drop Ophthalmic Once         lidocaine 1 % 1 mL  1 mL Other Q1H PRN   1 mL at 10/25/17 0730     lidocaine (LMX4) cream   Topical Q1H PRN         sodium chloride (PF) 0.9% PF flush 3 mL  3 mL Intracatheter Q1H PRN         sodium chloride (PF) 0.9% PF flush 3 mL  3  mL Intracatheter Q8H         lactated ringers infusion   Intravenous Continuous         No current Epic-ordered outpatient prescriptions on file.     Wt Readings from Last 1 Encounters:   10/24/17 91.2 kg (201 lb)     Temp Readings from Last 1 Encounters:   10/25/17 36.2  C (97.1  F) (Temporal)     BP Readings from Last 6 Encounters:   10/25/17 136/81   04/04/17 120/64   02/26/14 166/81   02/12/14 146/84   02/05/14 118/83   01/27/12 149/72     Pulse Readings from Last 4 Encounters:   04/03/17 90     Resp Readings from Last 1 Encounters:   10/25/17 16     SpO2 Readings from Last 1 Encounters:   10/25/17 95%     Recent Labs   Lab Test  04/04/17   0615  04/03/17   1050   NA   --   143   POTASSIUM   --   3.2*   CHLORIDE   --   106   CO2   --   31   ANIONGAP   --   6   GLC  250*  90   BUN   --   23   CR   --   0.72   CASTILLO   --   9.5     Recent Labs   Lab Test  04/04/17   0615  04/03/17   1050   HGB  12.2  15.4     No results for input(s): INR in the last 06443 hours.    Invalid input(s): APTT   RECENT LABS:   ECG:   ECHO:   CXR:                      .

## 2017-10-25 NOTE — OP NOTE
PROCEDURE/SERVICE DATE:  10/25/2017.      PREOPERATIVE DIAGNOSIS:  Epiretinal membrane, left eye.      POSTOPERATIVE DIAGNOSIS:  Epiretinal membrane, left eye.      PROCEDURE:  25-gauge pars plana vitrectomy, membrane peel and air-fluid exchange, left eye.      SURGEON:  Kevin Melton MD.      ASSISTANT:  VALDEZ Valles.      ANESTHESIA:  Local with monitored anesthesia care.      ESTIMATED BLOOD LOSS:  Minimum.      SPECIMENS:  None.      COMPLICATIONS:  None.      INDICATIONS:  Ivania Rubi presented with decreased central vision in the left eye due to an epiretinal membrane.  Of note, she previously underwent laser for a retinal tear in the left eye several years ago.  Surgical intervention to address the macular pathology was offered, and the patient decided to proceed after the risks, benefits and alternatives were explained.  Signed and witnessed informed consent was obtained.      DESCRIPTION OF PROCEDURE:  The patient was given a retrobulbar block in the preoperative holding area.  The patient was taken to the operating room and placed in a supine position.  The left eye was prepped and draped in the usual sterile fashion for ophthalmic surgery, and a lid speculum was placed.  The eye was opened for standard 25-gauge pars plana vitrectomy.  The eye was entered with a light pipe and vitrectomy probe, and the BIOM was positioned.  A thorough vitrectomy was carried out.  Of note, the patient had a preexisting posterior vitreous separation.        Attention was turned to the posterior pole.  A magnifying contact lens was placed on the cornea.  Dilute triamcinolone was injected over the macular surface.  The epiretinal membrane and underlying internal limiting membrane were peeled from arcade to arcade with 25-gauge forceps.  The retinal periphery was then inspected with scleral depression.  The patient had chorioretinal scarring present at the 1:30 meridian consistent with previous laser treatment for the  retinal tear.  No new tears were present, and the retina was fully attached.  A partial air-fluid exchange was performed.  All trocars were removed.  The eye was left at physiologic pressure.  Subconjunctival injections of Ancef and Dexamethasone were placed.  Maxitrol and atropine were applied.  A sterile eye pad and shield were taped into position.  The patient was taken to the recovery area in stable condition after tolerating surgery well.        She will follow up in 1 day.         ROCHELLE ZAIDI MD             D: 10/25/2017 08:30   T: 10/25/2017 11:59   MT: [id number]      Name:     KENRICK WADE   MRN:      [id number]        Account:        [id number]   :      1940           Procedure Date: 10/25/2017      Document: [id number]

## 2017-10-25 NOTE — OR NURSING
Green Armband applied to left wrist. Patient instructed to not remove armband until told to do so by physician.     POST-OP POSITION: SUNIL Mejia RN

## 2017-10-25 NOTE — ANESTHESIA POSTPROCEDURE EVALUATION
Patient: Ivania Rubi    Procedure(s):  LEFT EYE VITRECTOMY PARSPLANA WITH 25 GAUGE SYSTEM ; MEMBRANE PEEL ; AIR FLUID EXCHANGE - Wound Class: I-Clean    Diagnosis:EPIRETINAL MEMBRANE   Diagnosis Additional Information: No value filed.    Anesthesia Type:  MAC    Note:  Anesthesia Post Evaluation    Patient location during evaluation: PACU  Patient participation: Able to fully participate in evaluation  Level of consciousness: awake and alert  Pain management: satisfactory to patient  Airway patency: patent  Cardiovascular status: hemodynamically stable  Respiratory status: acceptable and unassisted  Hydration status: balanced  PONV: none     Anesthetic complications: None          Last vitals:  Vitals:    10/25/17 0713 10/25/17 0830 10/25/17 0845   BP: 136/81 131/82 132/87   Resp: 16 16 16   Temp: 36.2  C (97.1  F)     SpO2: 95% 97% 97%         Electronically Signed By: Isael Alvarez MD  October 25, 2017  9:25 AM

## 2017-10-25 NOTE — BRIEF OP NOTE
[facility] Brief Operative Note    Pre-operative diagnosis: EPIRETINAL MEMBRANE, left eye   Post-operative diagnosis EPIRETINAL MEMBRANE, left eye   Procedure: Procedure(s):  LEFT EYE VITRECTOMY PARSPLANA WITH 25 GAUGE SYSTEM ; MEMBRANE PEEL ; AIR FLUID EXCHANGE - Wound Class: I-Clean   Surgeon(s): Surgeon(s) and Role:     * Kevin Melton MD - Primary   Estimated blood loss: * No values recorded between 10/25/2017  8:09 AM and 10/25/2017  8:29 AM *    Specimens: * No specimens in log *   Findings: As above

## 2017-10-25 NOTE — ADDENDUM NOTE
Addendum  created 10/25/17 0940 by Monserrat Osborn APRN CRNA    Anesthesia Event edited, Procedure Event Log accessed

## 2017-10-25 NOTE — IP AVS SNAPSHOT
MRN:[id number]                      After Visit Summary   10/25/2017    Ivania Rubi    MRN: [id number]           Thank you!     Thank you for choosing [facility] for your care. Our goal is always to provide you with excellent care. Hearing back from our patients is one way we can continue to improve our services. Please take a few minutes to complete the written survey that you may receive in the mail after you visit with us. Thank you!        Patient Information     Date Of Birth          1940        About your hospital stay     You were admitted on:  October 25, 2017 You last received care in the:  [facility]    You were discharged on:  October 25, 2017       Who to Call     For medical emergencies, please call 911.  For non-urgent questions about your medical care, please call your primary care provider or clinic, [phone]  For questions related to your surgery, please call your surgery clinic        Attending Provider     Provider Specialty    Kevin Melton MD Surgery       Primary Care Provider Office Phone # Fax #    Cj Saha [phone]      After Care Instructions     Activity       Avoid strenuous activities the next several days.                  Further instructions from your care team       [facility] Discharge  Instructions  Anesthesia ([facility])   Adult Discharge Instructions    For 24 hours after surgery    1. Get plenty of rest.  Make arrangements to have a responsible adult stay with you for at least 6 hours after you leave the hospital.  2. Do not drive or use heavy equipment for 24 hours.    3. Do not drink alcohol for 24 hours.  4. Do not sign legal documents or make important decisions for 24 hours.  5. Avoid strenuous or risky activities. You may feel lightheaded.  If so, sit for a few minutes before standing.  Have someone help you get up.   6. Conscious sedation patients  may resume a regular diet..  7. Any questions of medical nature, call your physician.     INSTRUCTIONS FOLLOWING SURGERY  DR. CORNELL, DR. ZAIDI, DR. MELÉNDEZ, DR. BARROSO, DR. HARPER    Will I have pain?  Some discomfort is normal and expected following surgery.  The first few days after surgery you may need to use prescription pain pills.  Taking Advil (Ibuprofen) regularly may help prevent pain.    Discomfort should gradually decrease and Tylenol or Advil should be sufficient to relieve pain. A foreign body sensation in the cornea of the eye is very common and caused by sutures placed at surgery. These sutures will go away in one to two weeks. If the pain worsens, you should call the doctor.     Do I need to wear an eye patch?  You do not need to wear an eye patch at home after the doctor has removed the patch on your first day after surgery.  However, you may be more comfortable wearing a patch outside in the sun, when sleeping or napping, or in a brando, windy environment.     How much drainage should I have?  You may expect a moderate amount of drainage for a week.  Gradually the drainage should decrease.  The lids can be cleaned with a clean washcloth and gentle soap or diluted baby shampoo.  Wipe the eyelids gently from the nose outward. Some blood in the tears is a normal finding.    Will there be swelling?  Some swelling is normal for about a week or two after which it will gradually decrease.  Applying a cool compress, using a clean washcloth, for 5 - 10 minutes several times a day may reduce the swelling and make you more comfortable.  People may have some swelling of both eyes, especially if face down positioning is required. The white part of the eye may appear very red or bloody for a week or two. This may get worse a few days after surgery. Though the bright red appearance can look frightening, it is a normal finding early after surgery and will resolve in a few weeks.    Will I need to use eye  drops?  You will be using several different kinds of eye drops or ointment (salve) when you leave the hospital.  The directions will be on each bottle. The medication with the red top will keep your eye dilated and may make your eye more sensitive to light. Wearing sunglasses may help. The other medication is a combination antibiotics/steroid to prevent infection and promote healing.  Occasionally a third drop is used to control the pressure in your eye. A new bottle of artificial tears or lubricant ointment may be used along with your prescription eye drops after surgery. You will be using drops from four to eight weeks. Bring all eye medications (drops, ointments, or pills) with you to each visit.    Always wash your hands before putting in the eye drops.  You may wish to have someone else help you.  Pull down on the lower lid and squeeze one drop from the bottle being careful not to touch the dropper to your eye or eyelid.  One drop is sufficient, but another may be used if the first did not go into the eye.  It is often easier to put in the drops if you are reclining or lying down.  Wait five (5) minutes after the first drop before using the second drop to allow the medications to absorb into the eye.        How long will it take for my vision to improve?  Your vision should gradually improve, but it may take up to six months to regain your best vision.  Frequently, air or gas bubbles are injected into the eye at the time of surgery.  This will blur your vision significantly at first.  As the bubble becomes smaller it will cause a black line in your vision that moves as you move your head.  As the bubble becomes smaller you may notice that it looks more like a bubble or that it will break up into several smaller bubbles. It will take from a few days to a few weeks for the bubble to dissolve and be replaced by clear fluid.     You may notice floaters or double vision after your surgery.  These symptoms usually  will decrease with time.  If the double vision is bothersome patching the eye may help.    If you notice a sudden worsening in your vision call your doctor.    Are there any physical restrictions after surgery?  If an air or gas bubble was placed in the eye during surgery you will be asked to spend most of your time (both awake and during the night) with your head in a specific position, frequently face down.  As the eye heals and the bubble dissolves there will be less of a need for you to stay in that specific position.  You should avoid sleeping on your back until the bubble has totally dissolved and you have been given permission from your surgeon. You should not fly in an airplane or go in high altitudes in the mountains while there is a bubble in your eye. If you should require any other surgery, under general anesthesia, while you still have an air bubble, have your surgeon or anesthetist contact us prior to your surgery. Some anesthetic agents can make the bubble expand and seriously damage your eye.  You will have a green medical alert band placed on your wrist for this reason; this should not be removed until the doctor gives you permission or removes it for you.  POSITIONING:  MAY SIT OR SLEEP AS DESIRED    Heavy lifting (greater than 50 pounds), swimming and contact sports should be avoided for about 3 to 4 weeks after surgery.    You may resume your usual sexual activities about one week after surgery.    When may I return to work or my normal activities?  Depending on the type of work, you may return to work within a few days.  If your work involves physical activity or driving, you will need to restrict your activities and remain home longer.    You may watch TV, look at magazines, or work puzzles.  Reading may be uncomfortable for several days, but using the eyes will not cause any damage.    You may go outside as usual.  If conditions are windy or brando wear an eye pad to avoid getting dust or dirt  in the eye.    Can I travel?  You cannot fly in an airplane or drive into the mountains as long as the air or gas bubble remains in your eye.    Are there any driving restrictions?  Someone will need to drive you home from the hospital.  Generally driving can be resumed in several days if you have good vision in your other eye.  If you do not feel comfortable driving, do not drive!  Your depth perception will be decreased so you will want to try driving during the day in light traffic until you feel comfortable driving.  You should restrict your driving while you are taking prescription pain pills as they also can affect your judgment.    When can I shower and wash my hair?  You may shower or bathe when you get home, but avoid getting water in your eye.  You may want someone to help you shampoo your hair at first.      You may shave, brush your teeth, or comb your hair.  Do not use make-up, mascara, or creams/lotions around your eyes for several weeks     When will I see the doctor again?  Generally you will be seen the first day after surgery and again 1-2 weeks later.  If you have not received a return appointment before leaving the hospital, you should call our office during the business hours to arrange an appointment. If you will be seeing your local doctor instead of us, you will need to call that office to set up an appointment.    How do I reach a doctor if I have concerns?  One of our doctors is available by calling [phone] in the [facility] [place], [phone] in [place], or [phone] from outside the area.  After normal office hours the answering service will put you in touch with the doctor on call. The doctors take call from home but are available for a retinal emergency. Please try to call for routine questions and prescription refills during business hours.    You should call your doctor if:      You notice a sudden decrease in your vision.      Have severe pain or pain increases rather  "than subsiding.      You notice a new black curtain over your eye that is not the gas bubble.    If you have any of these symptoms, you may need to be examined.    Pending Results     No orders found from 10/23/2017 to 10/26/2017.            Admission Information     Date & Time Provider Department Dept. Phone    10/25/2017 Kevin Melton MD [facility] [phone]      Your Vitals Were     Blood Pressure Temperature Respirations Height Weight Pulse Oximetry    131/82 97.1  F (36.2  C) (Temporal) 16 1.676 m (5' 6\") 91.2 kg (201 lb) 97%    BMI (Body Mass Index)                   32.44 kg/m2           MyChart Information     [facility] lets you send messages to your doctor, view your test results, renew your prescriptions, schedule appointments and more. To sign up, go to [email or web address] . Click on \"Log in\" on the left side of the screen, which will take you to the Welcome page. Then click on \"Sign up Now\" on the right side of the page.     You will be asked to enter the access code listed below, as well as some personal information. Please follow the directions to create your username and password.     Your access code is: [email or web address]  Expires: 2018  8:42 AM     Your access code will  in 90 days. If you need help or a new code, please call your [facility] clinic or [phone].        Care EveryWhere ID     This is your Care EveryWhere ID. This could be used by other organizations to access your [facility] medical records  [id number]        Equal Access to Services     [facility] campusDERRICK : Hadsloane Shields, waaxda luqadaha, qaybrosita bestalmelinda major. So [facility] [phone].    ATENCIÓN: Si habla español, tiene a belcher disposición servicios gratuitos de asistencia lingüística. Llame al [phone].    We comply with applicable federal civil rights laws and [place] laws. We do not discriminate on the basis of race, color, national " origin, age, disability, sex, sexual orientation, or gender identity.               Review of your medicines      UNREVIEWED medicines. Ask your doctor about these medicines        Dose / Directions    aspirin 81 MG EC tablet        Dose:  81 mg   Take 81 mg by mouth daily   Refills:  0       THOMAS BACK & BODY 500-32.5 MG Tabs   Generic drug:  Aspirin-Caffeine        Dose:  2 tablet   Take 2 tablets by mouth daily as needed   Refills:  0       CALCIUM PO        Dose:  1 tablet   Take 1 tablet by mouth 2 times daily   Refills:  0       celeXA 20 MG tablet   Generic drug:  citalopram        Dose:  20 mg   Take 20 mg by mouth every morning   Refills:  0       CENTRUM SILVER per tablet        Dose:  1 tablet   Take 1 tablet by mouth daily   Refills:  0       FISH OIL PO        Dose:  1 capsule   Take 1 capsule by mouth 2 times daily   Refills:  0       LIPITOR PO        Dose:  40 mg   Take 40 mg by mouth daily   Refills:  0       lisinopril-hydrochlorothiazide 20-12.5 MG per tablet   Commonly known as:  PRINZIDE/ZESTORETIC        Dose:  1 tablet   Take 1 tablet by mouth daily   Refills:  0       omeprazole 20 MG CR capsule   Commonly known as:  priLOSEC        Dose:  20 mg   Take 20 mg by mouth every morning   Refills:  0       TAPAZOLE 5 MG tablet   Generic drug:  methimazole        Dose:  5 mg   Take 5 mg by mouth daily   Refills:  0       TYLENOL ARTHRITIS PAIN PO        Dose:  1300 mg   Take 1,300 mg by mouth 3 times daily   Refills:  0       VITAMIN D-3 PO        Dose:  1 tablet   Take 1 tablet by mouth every evening   Refills:  0         CONTINUE these medicines which have NOT CHANGED        Dose / Directions    oxyCODONE 5 MG IR tablet   Commonly known as:  ROXICODONE   Used for:  S/P reverse total shoulder arthroplasty, right        Dose:  5-10 mg   Take 1-2 tablets (5-10 mg) by mouth every 4 hours as needed for moderate to severe pain   Quantity:  50 tablet   Refills:  0       senna-docusate 8.6-50 MG per  tablet   Commonly known as:  SENOKOT-S;PERICOLACE   Used for:  S/P reverse total shoulder arthroplasty, right        Dose:  1-2 tablet   Take 1-2 tablets by mouth 2 times daily   Quantity:  50 tablet   Refills:  0                Protect others around you: Learn how to safely use, store and throw away your medicines at www.disposemymeds.org.             Medication List: This is a list of all your medications and when to take them. Check marks below indicate your daily home schedule. Keep this list as a reference.      Medications           Morning Afternoon Evening Bedtime As Needed    aspirin 81 MG EC tablet   Take 81 mg by mouth daily                                THOMAS BACK & BODY 500-32.5 MG Tabs   Take 2 tablets by mouth daily as needed   Generic drug:  Aspirin-Caffeine                                CALCIUM PO   Take 1 tablet by mouth 2 times daily                                celeXA 20 MG tablet   Take 20 mg by mouth every morning   Generic drug:  citalopram                                CENTRUM SILVER per tablet   Take 1 tablet by mouth daily                                FISH OIL PO   Take 1 capsule by mouth 2 times daily                                LIPITOR PO   Take 40 mg by mouth daily                                lisinopril-hydrochlorothiazide 20-12.5 MG per tablet   Commonly known as:  PRINZIDE/ZESTORETIC   Take 1 tablet by mouth daily                                omeprazole 20 MG CR capsule   Commonly known as:  priLOSEC   Take 20 mg by mouth every morning                                oxyCODONE 5 MG IR tablet   Commonly known as:  ROXICODONE   Take 1-2 tablets (5-10 mg) by mouth every 4 hours as needed for moderate to severe pain                                senna-docusate 8.6-50 MG per tablet   Commonly known as:  SENOKOT-S;PERICOLACE   Take 1-2 tablets by mouth 2 times daily                                TAPAZOLE 5 MG tablet   Take 5 mg by mouth daily   Generic drug:  methimazole                                 TYLENOL ARTHRITIS PAIN PO   Take 1,300 mg by mouth 3 times daily                                VITAMIN D-3 PO   Take 1 tablet by mouth every evening

## 2017-10-25 NOTE — DISCHARGE INSTRUCTIONS
[facility] Discharge  Instructions  Anesthesia (Eye Care Center)   Adult Discharge Instructions    For 24 hours after surgery    1. Get plenty of rest.  Make arrangements to have a responsible adult stay with you for at least 6 hours after you leave the hospital.  2. Do not drive or use heavy equipment for 24 hours.    3. Do not drink alcohol for 24 hours.  4. Do not sign legal documents or make important decisions for 24 hours.  5. Avoid strenuous or risky activities. You may feel lightheaded.  If so, sit for a few minutes before standing.  Have someone help you get up.   6. Conscious sedation patients may resume a regular diet..  7. Any questions of medical nature, call your physician.     INSTRUCTIONS FOLLOWING SURGERY  DR. CORNELL, DR. ZAIDI, DR. MELÉNDEZ, DR. BARROSO, DR. HARPER    Will I have pain?  Some discomfort is normal and expected following surgery.  The first few days after surgery you may need to use prescription pain pills.  Taking Advil (Ibuprofen) regularly may help prevent pain.    Discomfort should gradually decrease and Tylenol or Advil should be sufficient to relieve pain. A foreign body sensation in the cornea of the eye is very common and caused by sutures placed at surgery. These sutures will go away in one to two weeks. If the pain worsens, you should call the doctor.     Do I need to wear an eye patch?  You do not need to wear an eye patch at home after the doctor has removed the patch on your first day after surgery.  However, you may be more comfortable wearing a patch outside in the sun, when sleeping or napping, or in a brando, windy environment.     How much drainage should I have?  You may expect a moderate amount of drainage for a week.  Gradually the drainage should decrease.  The lids can be cleaned with a clean washcloth and gentle soap or diluted baby shampoo.  Wipe the eyelids gently from the nose outward. Some blood in the tears is a normal  finding.    Will there be swelling?  Some swelling is normal for about a week or two after which it will gradually decrease.  Applying a cool compress, using a clean washcloth, for 5 - 10 minutes several times a day may reduce the swelling and make you more comfortable.  People may have some swelling of both eyes, especially if face down positioning is required. The white part of the eye may appear very red or bloody for a week or two. This may get worse a few days after surgery. Though the bright red appearance can look frightening, it is a normal finding early after surgery and will resolve in a few weeks.    Will I need to use eye drops?  You will be using several different kinds of eye drops or ointment (salve) when you leave the hospital.  The directions will be on each bottle. The medication with the red top will keep your eye dilated and may make your eye more sensitive to light. Wearing sunglasses may help. The other medication is a combination antibiotics/steroid to prevent infection and promote healing.  Occasionally a third drop is used to control the pressure in your eye. A new bottle of artificial tears or lubricant ointment may be used along with your prescription eye drops after surgery. You will be using drops from four to eight weeks. Bring all eye medications (drops, ointments, or pills) with you to each visit.    Always wash your hands before putting in the eye drops.  You may wish to have someone else help you.  Pull down on the lower lid and squeeze one drop from the bottle being careful not to touch the dropper to your eye or eyelid.  One drop is sufficient, but another may be used if the first did not go into the eye.  It is often easier to put in the drops if you are reclining or lying down.  Wait five (5) minutes after the first drop before using the second drop to allow the medications to absorb into the eye.        How long will it take for my vision to improve?  Your vision should  gradually improve, but it may take up to six months to regain your best vision.  Frequently, air or gas bubbles are injected into the eye at the time of surgery.  This will blur your vision significantly at first.  As the bubble becomes smaller it will cause a black line in your vision that moves as you move your head.  As the bubble becomes smaller you may notice that it looks more like a bubble or that it will break up into several smaller bubbles. It will take from a few days to a few weeks for the bubble to dissolve and be replaced by clear fluid.     You may notice floaters or double vision after your surgery.  These symptoms usually will decrease with time.  If the double vision is bothersome patching the eye may help.    If you notice a sudden worsening in your vision call your doctor.    Are there any physical restrictions after surgery?  If an air or gas bubble was placed in the eye during surgery you will be asked to spend most of your time (both awake and during the night) with your head in a specific position, frequently face down.  As the eye heals and the bubble dissolves there will be less of a need for you to stay in that specific position.  You should avoid sleeping on your back until the bubble has totally dissolved and you have been given permission from your surgeon. You should not fly in an airplane or go in high altitudes in the mountains while there is a bubble in your eye. If you should require any other surgery, under general anesthesia, while you still have an air bubble, have your surgeon or anesthetist contact us prior to your surgery. Some anesthetic agents can make the bubble expand and seriously damage your eye.  You will have a green medical alert band placed on your wrist for this reason; this should not be removed until the doctor gives you permission or removes it for you.  POSITIONING:  MAY SIT OR SLEEP AS DESIRED    Heavy lifting (greater than 50 pounds), swimming and contact  sports should be avoided for about 3 to 4 weeks after surgery.    You may resume your usual sexual activities about one week after surgery.    When may I return to work or my normal activities?  Depending on the type of work, you may return to work within a few days.  If your work involves physical activity or driving, you will need to restrict your activities and remain home longer.    You may watch TV, look at magazines, or work puzzles.  Reading may be uncomfortable for several days, but using the eyes will not cause any damage.    You may go outside as usual.  If conditions are windy or brando wear an eye pad to avoid getting dust or dirt in the eye.    Can I travel?  You cannot fly in an airplane or drive into the mountains as long as the air or gas bubble remains in your eye.    Are there any driving restrictions?  Someone will need to drive you home from the hospital.  Generally driving can be resumed in several days if you have good vision in your other eye.  If you do not feel comfortable driving, do not drive!  Your depth perception will be decreased so you will want to try driving during the day in light traffic until you feel comfortable driving.  You should restrict your driving while you are taking prescription pain pills as they also can affect your judgment.    When can I shower and wash my hair?  You may shower or bathe when you get home, but avoid getting water in your eye.  You may want someone to help you shampoo your hair at first.      You may shave, brush your teeth, or comb your hair.  Do not use make-up, mascara, or creams/lotions around your eyes for several weeks     When will I see the doctor again?  Generally you will be seen the first day after surgery and again 1-2 weeks later.  If you have not received a return appointment before leaving the hospital, you should call our office during the business hours to arrange an appointment. If you will be seeing your local doctor instead of us,  you will need to call that office to set up an appointment.    How do I reach a doctor if I have concerns?  One of our doctors is available by calling [phone] in the [place] area, [phone] in [place], or [phone] from outside the area.  After normal office hours the answering service will put you in touch with the doctor on call. The doctors take call from home but are available for a retinal emergency. Please try to call for routine questions and prescription refills during business hours.    You should call your doctor if:      You notice a sudden decrease in your vision.      Have severe pain or pain increases rather than subsiding.      You notice a new black curtain over your eye that is not the gas bubble.    If you have any of these symptoms, you may need to be examined.

## 2017-10-25 NOTE — IP AVS SNAPSHOT
[facility]    [address]    [place] [address]    Phone:  [phone]    Fax:  [phone]                                       After Visit Summary   10/25/2017    Ivania Rubi    MRN: [id number]           After Visit Summary Signature Page     I have received my discharge instructions, and my questions have been answered. I have discussed any challenges I see with this plan with the nurse or doctor.    ..........................................................................................................................................  Patient/Patient Representative Signature      ..........................................................................................................................................  Patient Representative Print Name and Relationship to Patient    ..................................................               ................................................  Date                                            Time    ..........................................................................................................................................  Reviewed by Signature/Title    ...................................................              ..............................................  Date                                                            Time

## 2018-02-07 ENCOUNTER — APPOINTMENT (OUTPATIENT)
Age: 78
Setting detail: DERMATOLOGY
End: 2018-03-03

## 2018-02-07 DIAGNOSIS — Z85.828 PERSONAL HISTORY OF OTHER MALIGNANT NEOPLASM OF SKIN: ICD-10-CM

## 2018-02-07 DIAGNOSIS — D18.0 HEMANGIOMA: ICD-10-CM

## 2018-02-07 DIAGNOSIS — L81.4 OTHER MELANIN HYPERPIGMENTATION: ICD-10-CM

## 2018-02-07 DIAGNOSIS — L82.1 OTHER SEBORRHEIC KERATOSIS: ICD-10-CM

## 2018-02-07 DIAGNOSIS — L57.0 ACTINIC KERATOSIS: ICD-10-CM

## 2018-02-07 DIAGNOSIS — Z71.89 OTHER SPECIFIED COUNSELING: ICD-10-CM

## 2018-02-07 DIAGNOSIS — L82.0 INFLAMED SEBORRHEIC KERATOSIS: ICD-10-CM

## 2018-02-07 DIAGNOSIS — D22 MELANOCYTIC NEVI: ICD-10-CM

## 2018-02-07 DIAGNOSIS — L72.0 EPIDERMAL CYST: ICD-10-CM

## 2018-02-07 PROBLEM — D22.5 MELANOCYTIC NEVI OF TRUNK: Status: ACTIVE | Noted: 2018-02-07

## 2018-02-07 PROBLEM — D18.01 HEMANGIOMA OF SKIN AND SUBCUTANEOUS TISSUE: Status: ACTIVE | Noted: 2018-02-07

## 2018-02-07 PROCEDURE — 17000 DESTRUCT PREMALG LESION: CPT | Mod: 59

## 2018-02-07 PROCEDURE — OTHER COUNSELING: OTHER

## 2018-02-07 PROCEDURE — OTHER BENIGN DESTRUCTION COSMETIC: OTHER

## 2018-02-07 PROCEDURE — OTHER SUNSCREEN RECOMMENDATIONS: OTHER

## 2018-02-07 PROCEDURE — 99214 OFFICE O/P EST MOD 30 MIN: CPT | Mod: 25

## 2018-02-07 PROCEDURE — 17110 DESTRUCT B9 LESION 1-14: CPT

## 2018-02-07 PROCEDURE — OTHER MIPS QUALITY: OTHER

## 2018-02-07 PROCEDURE — OTHER LIQUID NITROGEN: OTHER

## 2018-02-07 ASSESSMENT — LOCATION SIMPLE DESCRIPTION DERM
LOCATION SIMPLE: RIGHT UPPER BACK
LOCATION SIMPLE: LEFT LIP
LOCATION SIMPLE: LEFT UPPER BACK
LOCATION SIMPLE: LEFT ZYGOMA
LOCATION SIMPLE: RIGHT LOWER BACK

## 2018-02-07 ASSESSMENT — LOCATION DETAILED DESCRIPTION DERM
LOCATION DETAILED: LEFT SUPERIOR MEDIAL UPPER BACK
LOCATION DETAILED: RIGHT MID-UPPER BACK
LOCATION DETAILED: LEFT MEDIAL UPPER BACK
LOCATION DETAILED: LEFT ORAL COMMISSURE
LOCATION DETAILED: RIGHT INFERIOR LATERAL MIDBACK
LOCATION DETAILED: RIGHT SUPERIOR UPPER BACK
LOCATION DETAILED: RIGHT SUPERIOR MEDIAL UPPER BACK
LOCATION DETAILED: LEFT MEDIAL ZYGOMA
LOCATION DETAILED: RIGHT INFERIOR MEDIAL UPPER BACK

## 2018-02-07 ASSESSMENT — LOCATION ZONE DERM
LOCATION ZONE: TRUNK
LOCATION ZONE: LIP
LOCATION ZONE: FACE

## 2018-02-07 NOTE — PROCEDURE: LIQUID NITROGEN
Render Post Care In The Note?: yes
Include Z78.9 (Other Specified Conditions Influencing Health Status) As An Associated Diagnosis?: No
Post-Care Instructions: I reviewed with the patient in detail post-care instructions. (Written instructions given) Patient is to wear sun protection, and avoid picking at any of the treated lesions. Pt may apply Vaseline to crusted or scabbing areas.
Number Of Freeze-Thaw Cycles: 1 freeze-thaw cycle
Consent: The patient's consent was obtained including but not limited to risks of crusting, scabbing, blistering, scarring, darker or lighter pigmentary change, recurrence, incomplete removal and infection.
Consent: The patient's verbal consent was obtained including but not limited to risks of crusting, scabbing, blistering, scarring, darker or lighter pigmentary change, recurrence, incomplete removal and infection.
Detail Level: Detailed
Duration Of Freeze Thaw-Cycle (Seconds): 10
Duration Of Freeze Thaw-Cycle (Seconds): 15
Post-Care Instructions: I reviewed with the patient in detail post-care instructions. Patient is to wear sunprotection, and avoid picking at any of the treated lesions. Pt may apply Vaseline to crusted or scabbing areas.
Medical Necessity Clause: This procedure was medically necessary because the lesions that were treated were:
Total Number Of Aks Treated: 1
Medical Necessity Information: It is in your best interest to select a reason for this procedure from the list below. All of these items fulfill various CMS LCD requirements except the new and changing color options.
Total Number Of Lesions Treated: 5
Detail Level: Simple

## 2018-02-07 NOTE — PROCEDURE: SUNSCREEN RECOMMENDATIONS
General Sunscreen Counseling: I recommended a broad spectrum (UVA/B blocking) sunscreen with a SPF of 30 or higher.  I explained that SPF 30 sunscreens block approximately 97 percent of the sun's harmful ultraviolet rays.  Sunscreens should be applied at least 15 minutes prior to expected sun exposure and then every 2 hours after that as long as sun exposure continues. If swimming or exercising, sunscreen should be reapplied every 45 minutes to an hour after getting wet or sweating.  One ounce, or the equivalent of a shot glass full of sunscreen, is adequate to protect the skin not covered by a bathing suit. I also recommended a lip balm with a SPF 30 sunscreen as well. Sun protective clothing can be used in lieu of sunscreen, but must be worn the entire time you are exposed to the sun's rays.  Such clothing is woven of fibers with a chemical structure that absorbs or reflects ultraviolet radiation.  The best hats for sun protection have a full 360 degree brim.  Baseball style caps do not protect the ears or the back and sides of the neck and are inadequate for effective sun protection.
Products Recommended: The following products were discussed:\\nAnthelios - La Roche Poussey\\nCoppertone Sport\\nNeutrogenia sunscreens (many to choose from)\\nIntellishade - Desmond (tinted)\\nDaily SPF 33 Protectant - [place] (non-tinted)
Detail Level: Detailed

## 2018-02-07 NOTE — PROCEDURE: MIPS QUALITY
Detail Level: Detailed
Quality 431: Preventive Care And Screening: Unhealthy Alcohol Use - Screening: Patient screened for unhealthy alcohol use using a single question and scores less than 2 times per year
Quality 130: Documentation Of Current Medications In The Medical Record: Current Medications Documented
Quality 110: Preventive Care And Screening: Influenza Immunization: Influenza Immunization previously received during influenza season
Quality 226: Preventive Care And Screening: Tobacco Use: Screening And Cessation Intervention: Patient screened for tobacco and never smoked

## 2018-02-07 NOTE — PROCEDURE: BENIGN DESTRUCTION COSMETIC
Anesthesia Type: 2% lidocaine with epinephrine
Price (Use Numbers Only, No Special Characters Or $): 50.00
Detail Level: Detailed
Anesthesia Volume In Cc: 0.2
Consent: The patient's consent was obtained including but not limited to risks of crusting, scabbing, blistering, scarring, darker or lighter pigmentary change, recurrence, incomplete removal and infection.
Post-Care Instructions: I reviewed with the patient in detail post-care instructions. Patient is to wear sunprotection, and avoid picking at any of the treated lesions. Pt may apply Vaniply to crusted or scabbing areas.

## 2018-12-27 ENCOUNTER — HOSPITAL ENCOUNTER (EMERGENCY)
Facility: CLINIC | Age: 78
Discharge: HOME | End: 2018-12-27
Attending: EMERGENCY MEDICINE | Admitting: EMERGENCY MEDICINE
Payer: MEDICARE

## 2018-12-27 ENCOUNTER — APPOINTMENT (OUTPATIENT)
Dept: GENERAL RADIOLOGY | Facility: CLINIC | Age: 78
End: 2018-12-27
Attending: EMERGENCY MEDICINE
Payer: MEDICARE

## 2018-12-27 VITALS
DIASTOLIC BLOOD PRESSURE: 68 MMHG | TEMPERATURE: 96.9 F | HEIGHT: 67 IN | RESPIRATION RATE: 16 BRPM | BODY MASS INDEX: 33.27 KG/M2 | WEIGHT: 212 LBS | SYSTOLIC BLOOD PRESSURE: 139 MMHG | OXYGEN SATURATION: 92 % | HEART RATE: 66 BPM

## 2018-12-27 DIAGNOSIS — J06.9 VIRAL URI: ICD-10-CM

## 2018-12-27 DIAGNOSIS — J45.21 MILD INTERMITTENT ASTHMA WITH EXACERBATION: ICD-10-CM

## 2018-12-27 LAB — INTERPRETATION ECG - MUSE: NORMAL

## 2018-12-27 PROCEDURE — 94640 AIRWAY INHALATION TREATMENT: CPT

## 2018-12-27 PROCEDURE — 25000125 ZZHC RX 250

## 2018-12-27 PROCEDURE — 71046 X-RAY EXAM CHEST 2 VIEWS: CPT

## 2018-12-27 PROCEDURE — 99284 EMERGENCY DEPT VISIT MOD MDM: CPT | Mod: 25

## 2018-12-27 PROCEDURE — 25000125 ZZHC RX 250: Performed by: EMERGENCY MEDICINE

## 2018-12-27 RX ORDER — IPRATROPIUM BROMIDE AND ALBUTEROL SULFATE 2.5; .5 MG/3ML; MG/3ML
3 SOLUTION RESPIRATORY (INHALATION) ONCE
Status: COMPLETED | OUTPATIENT
Start: 2018-12-27 | End: 2018-12-27

## 2018-12-27 RX ORDER — PREDNISONE 20 MG/1
40 TABLET ORAL ONCE
Status: COMPLETED | OUTPATIENT
Start: 2018-12-27 | End: 2018-12-27

## 2018-12-27 RX ORDER — IPRATROPIUM BROMIDE AND ALBUTEROL SULFATE 2.5; .5 MG/3ML; MG/3ML
SOLUTION RESPIRATORY (INHALATION)
Status: COMPLETED
Start: 2018-12-27 | End: 2018-12-27

## 2018-12-27 RX ORDER — ALBUTEROL SULFATE 90 UG/1
2 AEROSOL, METERED RESPIRATORY (INHALATION)
Qty: 1 INHALER | Refills: 0 | Status: ON HOLD | OUTPATIENT
Start: 2018-12-27 | End: 2022-12-25

## 2018-12-27 RX ORDER — PREDNISONE 20 MG/1
TABLET ORAL
Qty: 8 TABLET | Refills: 0 | Status: SHIPPED | OUTPATIENT
Start: 2018-12-27 | End: 2019-01-03

## 2018-12-27 RX ADMIN — IPRATROPIUM BROMIDE AND ALBUTEROL SULFATE 3 ML: 2.5; .5 SOLUTION RESPIRATORY (INHALATION) at 14:09

## 2018-12-27 RX ADMIN — PREDNISONE 40 MG: 20 TABLET ORAL at 15:13

## 2018-12-27 RX ADMIN — IPRATROPIUM BROMIDE AND ALBUTEROL SULFATE 3 ML: .5; 2.5 SOLUTION RESPIRATORY (INHALATION) at 14:09

## 2018-12-27 ASSESSMENT — ENCOUNTER SYMPTOMS
VOMITING: 0
WHEEZING: 1
FEVER: 0
SHORTNESS OF BREATH: 1
ABDOMINAL PAIN: 0
CHEST TIGHTNESS: 1
MYALGIAS: 1
COUGH: 1
NAUSEA: 0

## 2018-12-27 ASSESSMENT — MIFFLIN-ST. JEOR: SCORE: 1484.26

## 2018-12-27 NOTE — ED PROVIDER NOTES
"  History     Chief Complaint:  Shortness of Breath    HPI   Ivania Rubi is a 78 year old female with a history of asthma who presents with shortness of breath. The patient reports that about 1 month ago she developed a cold consisting of congestion, rhinorrhea and a cough that lasted for 3 weeks. During this time the patient states that she often was wheezing and it still hasn't completely resolved. This past week the patient states she developed some tightness in her chest. Last night the patient states she woke up with severe wheezing and shortness of breath. She states she called her allergist and PCP today but was unable to get an appointment with them causing her to present to the ED currently. The patient states that currently she is still wheezing, though much improved after receiving 1 DuoNeb in here. She also states she has some chest tightness, a cough, her voice is \"scratchy\", and she has some pain with breathing. The patient notes some general body aches, knee and heel pain which she attributes to arthritis and a knee replacement. She denies any fevers, nausea, vomiting, abdominal pain, swelling or pain in her legs, or any ear pain. She has not used any inhalers at home, stating she couldn't find any.     Allergies:  No known drug allergies     Medications:    Tylenol Arthritis Pain PO  Aspirin 81 mg EC tablet  [employer]  Lipitor PO  Calcium PO  Vitamin D-3 PO  Celexa  Prinzide/Zestoretic  Tapazole  Centrum Silver  Fish Oil PO  Prilosec  Roxicodone  Senokot-S; Pericolace    Past Medical History:    Allergic Rhinitis  Anemia   Arthritis  Depressive Disorder  Displacement of cervical intervertebral disc without myelopathy  GERD  Hypotension  Hypovitaminosis D  Nontoxic multinodular goiter  Obesity  PONV  Pure hypercholesterolemia  Rosacea  Spinal stenosis, lumbar region without neurogenic claudication  Symptomatic menopausal or female climacteric states  Asthma  Essential " "Hypertension    Past Surgical History:    Reverse total shoulder arthroplasty, left  Arthrodesis Foot  Cervical spine fusion  Thyroid Biopsy  Lumbar Spine Fusion, Anter Apprch  Carpal Tunnel Release  T&A  Abdominal Hysterectomy  TKA, right and left  Bunionectomy, Right and left  Phacoemulsification Clear Cornea with standard intraocular lens implant x 2  Release trigger finger  Vitrectomy parsplana with 25 gauge system    Family History:    History reviewed. No pertinent family history.     Social History:  Smoking Status: Never Smoker  Alcohol Use: Rarely  Patient presents alone.  Marital Status:  Single     Review of Systems   Constitutional: Negative for fever.   HENT: Negative for ear pain.    Respiratory: Positive for cough, chest tightness, shortness of breath and wheezing.    Cardiovascular: Negative for leg swelling.   Gastrointestinal: Negative for abdominal pain, nausea and vomiting.   Musculoskeletal: Positive for myalgias.   All other systems reviewed and are negative.    Physical Exam     Patient Vitals for the past 24 hrs:   BP Temp Temp src Pulse Resp SpO2 Height Weight   12/27/18 1624 -- -- -- -- -- 92 % -- --   12/27/18 1538 -- -- -- -- -- 98 % -- --   12/27/18 1534 139/68 -- -- 66 -- -- -- --   12/27/18 1500 -- -- -- -- -- 91 % -- --   12/27/18 1340 172/90 96.9  F (36.1  C) Temporal 67 16 96 % 1.702 m (5' 7\") 96.2 kg (212 lb)     Physical Exam  General: Well appearing, nontoxic. Resting comfortably  Head:  Scalp, face, and head appear normal  Eyes:  Pupils are equal, round, and reactive to light    Conjunctivae non-injected and sclerae white  ENT:    The external nose is normal    Pinnae are normal    The oropharynx is normal, mucous membranes moist    Posterior pharynx clear without swelling, exudates or erythema     Uvula is in the midline  Neck:  Normal range of motion    There is no rigidity noted    Trachea is in the midline  CV:  Regular rate and rhythm     Normal S1/S2, no S3/S4    No murmur " or rub. Radial pulses 2+ bilaterally   Resp:  Lungs are equal bilaterally    There is no tachypnea    No increased work of breathing    Diffuse expiratory wheezing. No rales or rhonchi.  GI:  Abdomen is soft, no rigidity or guarding    No distension, or mass    No tenderness or rebound tenderness   MS:  Normal muscular tone    Symmetric motor strength    No lower extremity edema  Skin:  No rash or acute skin lesions noted  Neuro:  Awake and alert    Speech is normal and fluent    Moves all extremities spontaneously  Psych: Normal affect.  Appropriate interactions.     Emergency Department Course     ECG (13:52:32):  Rate 61 bpm. MT interval 142 ms. QRS duration 98 ms. QT/QTc 414/416 ms. P-R-T axes 41 -29 32.   Normal sinus rhythm.  Minimal voltage criteria for LVH, may be normal variant.  Changes noted above.  Interpreted at 1507 by Jefe Lemus MD.    Imaging:  Radiographic findings were communicated with the patient who voiced understanding of the findings.    XR Chest 2 Views  No acute disease.  As read by radiology.    Interventions:    1409: Duoneb, 3 mL, nebulization   1513: Deltasone 40 mg PO    Emergency Department Course:  Past medical records, nursing notes, and vitals reviewed.  1501: I performed an exam of the patient and obtained history, as documented above.    The patient was sent for a chest X-ray while in the emergency department, findings above.    1623: I rechecked the patient. Findings and plan explained to the Patient. Patient discharged home with instructions regarding supportive care, medications, and reasons to return. The importance of close follow-up was reviewed.     Impression & Plan      Medical Decision Making:  Ivania Rubi is a 78 year old female who presents for evaluation of shortness of breath and wheezing.  Signs and symptoms are consistent with asthma exacerbation.  A broad differential was considered including foreign body, asthma, reactive airway disease,  pneumothorax, cardiac equivalent, viral induced wheezing, allergic phenomena, etc.  Patient feels improved after interventions here in ED.  There are no signs at this point of any serious etiologies including those mentioned above.  No indication for hospitalization at this time including no hypoxia, no marked increase in respiratory rate, minimal to no retractions.   Supportive outpatient management is indicated, medications for discharge noted above.  Close followup with primary care physician.  Return if increased wheezing, progressive shortness of breath, develops fever greater than 102.  Patient agreeable with plan of care and discharged in stable condition.     Diagnosis:    ICD-10-CM    1. Mild intermittent asthma with exacerbation J45.21    2. Viral URI J06.9        Disposition:  Discharged to home.    Discharge Medications:     Medication List      Started    albuterol 108 (90 Base) MCG/ACT inhaler  Commonly known as:  PROAIR HFA/PROVENTIL HFA/VENTOLIN HFA  2 puffs, Inhalation, EVERY 2 HOURS PRN     predniSONE 20 MG tablet  Commonly known as:  DELTASONE  Take two tablets (= 40mg) each day for 4 (four) days              Zaida Rodriguez  12/27/2018    EMERGENCY DEPARTMENT  I, Zaida Rodriguez, am serving as a scribe at 3:01 PM on 12/27/2018 to document services personally performed by Jefe Lemus MD based on my observations and the provider's statements to me.        Jefe Lemus MD  12/29/18 [id number]

## 2018-12-27 NOTE — ED AVS SNAPSHOT
Emergency Department  [address] [place] [address]  Phone:  [phone]  Fax:  [phone]                                    Ivania Rubi   MRN: [id number]    Department:   Emergency Department   Date of Visit:  12/27/2018           After Visit Summary Signature Page    I have received my discharge instructions, and my questions have been answered. I have discussed any challenges I see with this plan with the nurse or doctor.    ..........................................................................................................................................  Patient/Patient Representative Signature      ..........................................................................................................................................  Patient Representative Print Name and Relationship to Patient    ..................................................               ................................................  Date                                   Time    ..........................................................................................................................................  Reviewed by Signature/Title    ...................................................              ..............................................  Date                                               Time          22EPIC Rev 08/18

## 2020-06-30 ENCOUNTER — HOSPITAL ENCOUNTER (OUTPATIENT)
Dept: ULTRASOUND IMAGING | Facility: CLINIC | Age: 80
Discharge: HOME | End: 2020-06-30
Attending: SPECIALIST | Admitting: SPECIALIST
Payer: MEDICARE

## 2020-06-30 DIAGNOSIS — E04.2 MULTINODULAR GOITER: ICD-10-CM

## 2020-06-30 PROCEDURE — 76536 US EXAM OF HEAD AND NECK: CPT

## 2020-07-01 DIAGNOSIS — Z11.59 ENCOUNTER FOR SCREENING FOR OTHER VIRAL DISEASES: Primary | ICD-10-CM

## 2020-07-13 DIAGNOSIS — Z11.59 ENCOUNTER FOR SCREENING FOR OTHER VIRAL DISEASES: ICD-10-CM

## 2020-07-13 LAB
SARS-COV-2 RNA SPEC QL NAA+PROBE: NOT DETECTED
SPECIMEN SOURCE: NORMAL

## 2020-07-13 PROCEDURE — U0003 INFECTIOUS AGENT DETECTION BY NUCLEIC ACID (DNA OR RNA); SEVERE ACUTE RESPIRATORY SYNDROME CORONAVIRUS 2 (SARS-COV-2) (CORONAVIRUS DISEASE [COVID-19]), AMPLIFIED PROBE TECHNIQUE, MAKING USE OF HIGH THROUGHPUT TECHNOLOGIES AS DESCRIBED BY CMS-2020-01-R: HCPCS | Performed by: SPECIALIST

## 2020-07-13 PROCEDURE — 99207 ZZC NO BILLABLE SERVICE THIS VISIT: CPT

## 2020-07-15 ENCOUNTER — TELEPHONE (OUTPATIENT)
Dept: MEDSURG UNIT | Facility: CLINIC | Age: 80
End: 2020-07-15

## 2020-07-15 RX ORDER — DEXTROSE MONOHYDRATE 25 G/50ML
25-50 INJECTION, SOLUTION INTRAVENOUS
Status: CANCELLED | OUTPATIENT
Start: 2020-07-15

## 2020-07-15 RX ORDER — NICOTINE POLACRILEX 4 MG
15-30 LOZENGE BUCCAL
Status: CANCELLED | OUTPATIENT
Start: 2020-07-15

## 2020-07-15 NOTE — TELEPHONE ENCOUNTER
Pre-Procedure Negative COVID Test     Step 1 Patient Notification  Patient notified of the negative COVID test result    Step 2 Patient Information  Verified the patient remains symptom free   Patient informed to contact the ordering provider if any of the symptoms develop prior to the procedure    Fever/Chills   Cough   Shortness of breath   New loss of taste or smell  Sore throat  Muscle or body aches  Headaches  Fatigue  Vomiting or diarrhea    Step 3 Review Visitor Policy  Patient informed of the updated visitor policy   1 visitor allowed per patient   Visitor must screen negative for COVID symptoms   Visitor must wear a mask  Waiting rooms continue to be closed to visitors    Fady Green RN

## 2020-07-16 ENCOUNTER — HOSPITAL ENCOUNTER (OUTPATIENT)
Dept: ULTRASOUND IMAGING | Facility: CLINIC | Age: 80
End: 2020-07-16
Attending: SPECIALIST
Payer: MEDICARE

## 2020-07-16 ENCOUNTER — HOSPITAL ENCOUNTER (OUTPATIENT)
Facility: CLINIC | Age: 80
Discharge: HOME | End: 2020-07-16
Admitting: RADIOLOGY
Payer: MEDICARE

## 2020-07-16 VITALS
SYSTOLIC BLOOD PRESSURE: 163 MMHG | OXYGEN SATURATION: 95 % | TEMPERATURE: 98.3 F | DIASTOLIC BLOOD PRESSURE: 86 MMHG | RESPIRATION RATE: 16 BRPM

## 2020-07-16 DIAGNOSIS — E04.2 MULTIPLE THYROID NODULES: ICD-10-CM

## 2020-07-16 PROCEDURE — 88173 CYTOPATH EVAL FNA REPORT: CPT | Mod: 26,76

## 2020-07-16 PROCEDURE — 10005 FNA BX W/US GDN 1ST LES: CPT

## 2020-07-16 PROCEDURE — 25000125 ZZHC RX 250: Performed by: RADIOLOGY

## 2020-07-16 PROCEDURE — 88173 CYTOPATH EVAL FNA REPORT: CPT

## 2020-07-16 PROCEDURE — 40000863 ZZH STATISTIC RADIOLOGY XRAY, US, CT, MAR, NM

## 2020-07-16 RX ORDER — LIDOCAINE HYDROCHLORIDE 10 MG/ML
5 INJECTION, SOLUTION EPIDURAL; INFILTRATION; INTRACAUDAL; PERINEURAL ONCE
Status: DISCONTINUED | OUTPATIENT
Start: 2020-07-16 | End: 2020-07-17 | Stop reason: HOSPADM

## 2020-07-16 NOTE — DISCHARGE INSTRUCTIONS
Thyroid/Lymph Node Biopsy Discharge Instructions     After you go home:      You may resume your normal diet    Care of Puncture Site:      You may have mild bruising, soreness & swelling at the puncture site. This will go away in a few days    For swelling & bruising, you may use an ice pack on the site.     Activity:      You may go back to your normal activity    Avoid strenuous activity for 24 hours    Medicines:      You may resume all medications    For minor pain, you may take Acetaminophen (Tylenol) or Ibuprofen (Advil)            Call the provider who ordered this test if:      Increased redness or swelling at the site    Fluid or blood oozing from the site    Severe pain at the site    Chills or a fever greater than 101 F (38 C).    Any questions or concerns    Call  911 or go to the Emergency Room if:      Trouble breathing    Your neck swells    Bleeding that you cannot control    Severe difficulty swallowing    If you have questions call:      [facility] @ [phone]

## 2020-07-16 NOTE — PROGRESS NOTES
Care Suites Discharge Nursing Note    Patient Information  Name: Ivania Rubi  Age: 80 year old    Discharge Education:  Discharge instructions reviewed: Yes  Additional education/resources provided: n/a  Patient/patient representative verbalizes understanding: Yes  Patient discharging on new medications: No  Medication education completed: N/A    Discharge Plans:   Discharge location: home  Discharge ride contacted: N/A  Approximate discharge time: 1530    Discharge Criteria:  Discharge criteria met and vital signs stable: Yes bilateral neck sites are CDI-no hematoma or swelling.    Patient Belongs:  Patient belongings returned to patient: Yes    Nisha Lawrence RN

## 2020-07-16 NOTE — PROGRESS NOTES
PATIENT/VISITOR WELLNESS SCREENING    Step 1 Patient Screening    1. In the last month, have you been in contact with someone who was confirmed or suspected to have Coronavirus/COVID-19? No    2. Do you have the following symptoms?  Fever/Chills? No   Cough? Yes: baseline   Shortness of breath? NO   New loss of taste or smell? No  Sore throat? No  Muscle or body aches? Yes: basline  Headaches? No  Fatigue? No  Vomiting or diarrhea? No    Step 3 Refer to logic grid below for actions    NO SYMPTOM(S)    ACTIONS:  1. Standard rooming process  2. Provider to assess per normal protocol  3. Implement precautions as needed and per guidelines     POSITIVE SYMPTOM(S)  If positive for ANY of the following symptoms: fever, cough, shortness of breath, rash    ACTION:  1. Continue to have the patient wear a mask   2. Room patient as soon as possible  3. Don appropriate PPE when entering room  4. Provider evaluation

## 2020-07-16 NOTE — PROGRESS NOTES
Care Suites Admission Nursing Note    Patient Information  Name: Ivania Rubi  Age: 80 year old  Reason for admission: throid biopsy  Care Suites arrival time: 1415    Patient Admission/Assessment   Pre-procedure assessment complete: Yes  If abnormal assessment/labs, provider notified: N/A  NPO: N/A  Medications held per instructions/orders: N/A  Consent: obtained  If applicable, pregnancy test status: deferred  Patient oriented to room: Yes  Education/questions answered: Yes  Plan/other: Proceed    Discharge Planning  Accompanied by: self  Discharge name/phone number:   Overnight post sedation caregiver: n/a  Discharge location: home    Nisha Lawrence RN

## 2020-07-21 LAB — COPATH REPORT: NORMAL

## 2022-07-13 ENCOUNTER — HOSPITAL ENCOUNTER (OUTPATIENT)
Dept: ULTRASOUND IMAGING | Facility: CLINIC | Age: 82
Discharge: HOME | End: 2022-07-13
Attending: INTERNAL MEDICINE | Admitting: INTERNAL MEDICINE
Payer: MEDICARE

## 2022-07-13 DIAGNOSIS — E04.2 MULTINODULAR GOITER: ICD-10-CM

## 2022-07-13 PROCEDURE — 76536 US EXAM OF HEAD AND NECK: CPT

## 2022-11-21 RX ORDER — FAMOTIDINE 40 MG/1
40 TABLET, FILM COATED ORAL AT BEDTIME
COMMUNITY

## 2022-11-22 ENCOUNTER — APPOINTMENT (OUTPATIENT)
Dept: GENERAL RADIOLOGY | Facility: CLINIC | Age: 82
End: 2022-11-22
Attending: UROLOGY
Payer: MEDICARE

## 2022-11-22 ENCOUNTER — HOSPITAL ENCOUNTER (OUTPATIENT)
Facility: CLINIC | Age: 82
Discharge: HOME | End: 2022-11-22
Attending: UROLOGY | Admitting: UROLOGY
Payer: MEDICARE

## 2022-11-22 ENCOUNTER — ANESTHESIA EVENT (OUTPATIENT)
Dept: SURGERY | Facility: CLINIC | Age: 82
End: 2022-11-22
Payer: MEDICARE

## 2022-11-22 ENCOUNTER — ANESTHESIA (OUTPATIENT)
Dept: SURGERY | Facility: CLINIC | Age: 82
End: 2022-11-22
Payer: MEDICARE

## 2022-11-22 VITALS
SYSTOLIC BLOOD PRESSURE: 135 MMHG | RESPIRATION RATE: 16 BRPM | OXYGEN SATURATION: 94 % | DIASTOLIC BLOOD PRESSURE: 76 MMHG | HEIGHT: 66 IN | HEART RATE: 79 BPM | WEIGHT: 196.7 LBS | BODY MASS INDEX: 31.61 KG/M2 | TEMPERATURE: 97.5 F

## 2022-11-22 DIAGNOSIS — Z98.890 POST-OPERATIVE STATE: Primary | ICD-10-CM

## 2022-11-22 PROBLEM — M48.061 SPINAL STENOSIS, LUMBAR REGION, WITHOUT NEUROGENIC CLAUDICATION: Status: ACTIVE | Noted: 2022-11-22

## 2022-11-22 PROCEDURE — 255N000002 HC RX 255 OP 636: Performed by: UROLOGY

## 2022-11-22 PROCEDURE — C1894 INTRO/SHEATH, NON-LASER: HCPCS | Performed by: UROLOGY

## 2022-11-22 PROCEDURE — C2617 STENT, NON-COR, TEM W/O DEL: HCPCS | Performed by: UROLOGY

## 2022-11-22 PROCEDURE — 250N000009 HC RX 250: Performed by: UROLOGY

## 2022-11-22 PROCEDURE — C1769 GUIDE WIRE: HCPCS | Performed by: UROLOGY

## 2022-11-22 PROCEDURE — 360N000076 HC SURGERY LEVEL 3, PER MIN: Performed by: UROLOGY

## 2022-11-22 PROCEDURE — 999N000179 XR SURGERY CARM FLUORO LESS THAN 5 MIN W STILLS: Mod: TC

## 2022-11-22 PROCEDURE — 710N000009 HC RECOVERY PHASE 1, LEVEL 1, PER MIN: Performed by: UROLOGY

## 2022-11-22 PROCEDURE — 710N000012 HC RECOVERY PHASE 2, PER MINUTE: Performed by: UROLOGY

## 2022-11-22 PROCEDURE — 258N000003 HC RX IP 258 OP 636: Performed by: ANESTHESIOLOGY

## 2022-11-22 PROCEDURE — 250N000011 HC RX IP 250 OP 636: Performed by: NURSE ANESTHETIST, CERTIFIED REGISTERED

## 2022-11-22 PROCEDURE — 250N000011 HC RX IP 250 OP 636: Performed by: ANESTHESIOLOGY

## 2022-11-22 PROCEDURE — 370N000017 HC ANESTHESIA TECHNICAL FEE, PER MIN: Performed by: UROLOGY

## 2022-11-22 PROCEDURE — 250N000011 HC RX IP 250 OP 636: Performed by: PHYSICIAN ASSISTANT

## 2022-11-22 PROCEDURE — 82365 CALCULUS SPECTROSCOPY: CPT | Performed by: UROLOGY

## 2022-11-22 PROCEDURE — 258N000001 HC RX 258: Performed by: UROLOGY

## 2022-11-22 PROCEDURE — 999N000141 HC STATISTIC PRE-PROCEDURE NURSING ASSESSMENT: Performed by: UROLOGY

## 2022-11-22 PROCEDURE — 272N000001 HC OR GENERAL SUPPLY STERILE: Performed by: UROLOGY

## 2022-11-22 PROCEDURE — 250N000009 HC RX 250: Performed by: NURSE ANESTHETIST, CERTIFIED REGISTERED

## 2022-11-22 DEVICE — URETERAL STENT
Type: IMPLANTABLE DEVICE | Site: URETER | Status: FUNCTIONAL
Brand: POLARIS™ ULTRA

## 2022-11-22 RX ORDER — HYDROMORPHONE HCL IN WATER/PF 6 MG/30 ML
0.4 PATIENT CONTROLLED ANALGESIA SYRINGE INTRAVENOUS EVERY 5 MIN PRN
Status: DISCONTINUED | OUTPATIENT
Start: 2022-11-22 | End: 2022-11-22 | Stop reason: HOSPADM

## 2022-11-22 RX ORDER — CIPROFLOXACIN 2 MG/ML
400 INJECTION, SOLUTION INTRAVENOUS EVERY 12 HOURS
Status: DISCONTINUED | OUTPATIENT
Start: 2022-11-22 | End: 2022-11-22 | Stop reason: HOSPADM

## 2022-11-22 RX ORDER — ONDANSETRON 2 MG/ML
4 INJECTION INTRAMUSCULAR; INTRAVENOUS EVERY 30 MIN PRN
Status: DISCONTINUED | OUTPATIENT
Start: 2022-11-22 | End: 2022-11-22 | Stop reason: HOSPADM

## 2022-11-22 RX ORDER — LABETALOL HYDROCHLORIDE 5 MG/ML
10 INJECTION, SOLUTION INTRAVENOUS
Status: DISCONTINUED | OUTPATIENT
Start: 2022-11-22 | End: 2022-11-22 | Stop reason: HOSPADM

## 2022-11-22 RX ORDER — SODIUM CHLORIDE, SODIUM LACTATE, POTASSIUM CHLORIDE, CALCIUM CHLORIDE 600; 310; 30; 20 MG/100ML; MG/100ML; MG/100ML; MG/100ML
INJECTION, SOLUTION INTRAVENOUS CONTINUOUS
Status: DISCONTINUED | OUTPATIENT
Start: 2022-11-22 | End: 2022-11-22 | Stop reason: HOSPADM

## 2022-11-22 RX ORDER — CEFUROXIME AXETIL 500 MG/1
500 TABLET ORAL 2 TIMES DAILY
Qty: 10 TABLET | Refills: 0 | Status: SHIPPED | OUTPATIENT
Start: 2022-11-22 | End: 2022-11-27

## 2022-11-22 RX ORDER — FENTANYL CITRATE 50 UG/ML
INJECTION, SOLUTION INTRAMUSCULAR; INTRAVENOUS PRN
Status: DISCONTINUED | OUTPATIENT
Start: 2022-11-22 | End: 2022-11-22

## 2022-11-22 RX ORDER — PROPOFOL 10 MG/ML
INJECTION, EMULSION INTRAVENOUS PRN
Status: DISCONTINUED | OUTPATIENT
Start: 2022-11-22 | End: 2022-11-22

## 2022-11-22 RX ORDER — EPHEDRINE SULFATE 50 MG/ML
INJECTION, SOLUTION INTRAMUSCULAR; INTRAVENOUS; SUBCUTANEOUS PRN
Status: DISCONTINUED | OUTPATIENT
Start: 2022-11-22 | End: 2022-11-22

## 2022-11-22 RX ORDER — HYDROCODONE BITARTRATE AND ACETAMINOPHEN 5; 325 MG/1; MG/1
1-2 TABLET ORAL EVERY 4 HOURS PRN
Qty: 10 TABLET | Refills: 0 | Status: ON HOLD | OUTPATIENT
Start: 2022-11-22 | End: 2022-12-25

## 2022-11-22 RX ORDER — FENTANYL CITRATE 0.05 MG/ML
25 INJECTION, SOLUTION INTRAMUSCULAR; INTRAVENOUS
Status: DISCONTINUED | OUTPATIENT
Start: 2022-11-22 | End: 2022-11-22 | Stop reason: HOSPADM

## 2022-11-22 RX ORDER — ONDANSETRON 2 MG/ML
INJECTION INTRAMUSCULAR; INTRAVENOUS PRN
Status: DISCONTINUED | OUTPATIENT
Start: 2022-11-22 | End: 2022-11-22

## 2022-11-22 RX ORDER — DEXAMETHASONE SODIUM PHOSPHATE 4 MG/ML
INJECTION, SOLUTION INTRA-ARTICULAR; INTRALESIONAL; INTRAMUSCULAR; INTRAVENOUS; SOFT TISSUE PRN
Status: DISCONTINUED | OUTPATIENT
Start: 2022-11-22 | End: 2022-11-22

## 2022-11-22 RX ORDER — LACTOBACILLUS RHAMNOSUS GG 10B CELL
1 CAPSULE ORAL 2 TIMES DAILY
Status: ON HOLD | COMMUNITY
End: 2022-12-25

## 2022-11-22 RX ORDER — MEPERIDINE HYDROCHLORIDE 25 MG/ML
12.5 INJECTION INTRAMUSCULAR; INTRAVENOUS; SUBCUTANEOUS
Status: DISCONTINUED | OUTPATIENT
Start: 2022-11-22 | End: 2022-11-22 | Stop reason: HOSPADM

## 2022-11-22 RX ORDER — LIDOCAINE HYDROCHLORIDE 20 MG/ML
INJECTION, SOLUTION INFILTRATION; PERINEURAL PRN
Status: DISCONTINUED | OUTPATIENT
Start: 2022-11-22 | End: 2022-11-22

## 2022-11-22 RX ORDER — ONDANSETRON 4 MG/1
4 TABLET, ORALLY DISINTEGRATING ORAL EVERY 30 MIN PRN
Status: DISCONTINUED | OUTPATIENT
Start: 2022-11-22 | End: 2022-11-22 | Stop reason: HOSPADM

## 2022-11-22 RX ORDER — FENTANYL CITRATE 50 UG/ML
50 INJECTION, SOLUTION INTRAMUSCULAR; INTRAVENOUS EVERY 5 MIN PRN
Status: DISCONTINUED | OUTPATIENT
Start: 2022-11-22 | End: 2022-11-22 | Stop reason: HOSPADM

## 2022-11-22 RX ORDER — IOPAMIDOL 612 MG/ML
INJECTION, SOLUTION INTRAVASCULAR PRN
Status: DISCONTINUED | OUTPATIENT
Start: 2022-11-22 | End: 2022-11-22 | Stop reason: HOSPADM

## 2022-11-22 RX ORDER — HYDROCODONE BITARTRATE AND ACETAMINOPHEN 5; 325 MG/1; MG/1
1 TABLET ORAL ONCE
Status: DISCONTINUED | OUTPATIENT
Start: 2022-11-22 | End: 2022-11-22 | Stop reason: HOSPADM

## 2022-11-22 RX ORDER — MAGNESIUM HYDROXIDE 1200 MG/15ML
LIQUID ORAL PRN
Status: DISCONTINUED | OUTPATIENT
Start: 2022-11-22 | End: 2022-11-22 | Stop reason: HOSPADM

## 2022-11-22 RX ORDER — CETIRIZINE HYDROCHLORIDE 10 MG/1
10 TABLET ORAL DAILY
COMMUNITY

## 2022-11-22 RX ORDER — HYDROMORPHONE HCL IN WATER/PF 6 MG/30 ML
0.2 PATIENT CONTROLLED ANALGESIA SYRINGE INTRAVENOUS EVERY 5 MIN PRN
Status: DISCONTINUED | OUTPATIENT
Start: 2022-11-22 | End: 2022-11-22 | Stop reason: HOSPADM

## 2022-11-22 RX ORDER — ACETAMINOPHEN 325 MG/1
650 TABLET ORAL ONCE
Status: DISCONTINUED | OUTPATIENT
Start: 2022-11-22 | End: 2022-11-22 | Stop reason: HOSPADM

## 2022-11-22 RX ORDER — MIRABEGRON 25 MG/1
25 TABLET, FILM COATED, EXTENDED RELEASE ORAL DAILY
Status: ON HOLD | COMMUNITY
End: 2022-12-25

## 2022-11-22 RX ORDER — PROPOFOL 10 MG/ML
INJECTION, EMULSION INTRAVENOUS CONTINUOUS PRN
Status: DISCONTINUED | OUTPATIENT
Start: 2022-11-22 | End: 2022-11-22

## 2022-11-22 RX ORDER — FENTANYL CITRATE 50 UG/ML
25 INJECTION, SOLUTION INTRAMUSCULAR; INTRAVENOUS EVERY 5 MIN PRN
Status: DISCONTINUED | OUTPATIENT
Start: 2022-11-22 | End: 2022-11-22 | Stop reason: HOSPADM

## 2022-11-22 RX ADMIN — PROPOFOL 160 MG: 10 INJECTION, EMULSION INTRAVENOUS at 08:57

## 2022-11-22 RX ADMIN — CIPROFLOXACIN 400 MG: 2 INJECTION, SOLUTION INTRAVENOUS at 08:18

## 2022-11-22 RX ADMIN — FENTANYL CITRATE 25 MCG: 50 INJECTION, SOLUTION INTRAMUSCULAR; INTRAVENOUS at 09:25

## 2022-11-22 RX ADMIN — FENTANYL CITRATE 25 MCG: 50 INJECTION, SOLUTION INTRAMUSCULAR; INTRAVENOUS at 09:19

## 2022-11-22 RX ADMIN — FENTANYL CITRATE 25 MCG: 50 INJECTION, SOLUTION INTRAMUSCULAR; INTRAVENOUS at 09:09

## 2022-11-22 RX ADMIN — ONDANSETRON 4 MG: 2 INJECTION INTRAMUSCULAR; INTRAVENOUS at 10:03

## 2022-11-22 RX ADMIN — Medication 10 MG: at 09:01

## 2022-11-22 RX ADMIN — FENTANYL CITRATE 50 MCG: 50 INJECTION, SOLUTION INTRAMUSCULAR; INTRAVENOUS at 10:23

## 2022-11-22 RX ADMIN — PROPOFOL 10 MG: 10 INJECTION, EMULSION INTRAVENOUS at 10:04

## 2022-11-22 RX ADMIN — PROPOFOL 20 MG: 10 INJECTION, EMULSION INTRAVENOUS at 09:18

## 2022-11-22 RX ADMIN — PROPOFOL 200 MCG/KG/MIN: 10 INJECTION, EMULSION INTRAVENOUS at 08:57

## 2022-11-22 RX ADMIN — ONDANSETRON 4 MG: 2 INJECTION INTRAMUSCULAR; INTRAVENOUS at 09:05

## 2022-11-22 RX ADMIN — PROPOFOL 20 MG: 10 INJECTION, EMULSION INTRAVENOUS at 09:16

## 2022-11-22 RX ADMIN — LIDOCAINE HYDROCHLORIDE 100 MG: 20 INJECTION, SOLUTION INFILTRATION; PERINEURAL at 08:57

## 2022-11-22 RX ADMIN — Medication 10 MG: at 09:04

## 2022-11-22 RX ADMIN — FENTANYL CITRATE 25 MCG: 50 INJECTION, SOLUTION INTRAMUSCULAR; INTRAVENOUS at 08:52

## 2022-11-22 RX ADMIN — SODIUM CHLORIDE, POTASSIUM CHLORIDE, SODIUM LACTATE AND CALCIUM CHLORIDE: 600; 310; 30; 20 INJECTION, SOLUTION INTRAVENOUS at 08:18

## 2022-11-22 RX ADMIN — DEXAMETHASONE SODIUM PHOSPHATE 4 MG: 4 INJECTION, SOLUTION INTRA-ARTICULAR; INTRALESIONAL; INTRAMUSCULAR; INTRAVENOUS; SOFT TISSUE at 09:05

## 2022-11-22 RX ADMIN — HYDROMORPHONE HYDROCHLORIDE 0.2 MG: 0.2 INJECTION, SOLUTION INTRAMUSCULAR; INTRAVENOUS; SUBCUTANEOUS at 10:43

## 2022-11-22 ASSESSMENT — ACTIVITIES OF DAILY LIVING (ADL)
ADLS_ACUITY_SCORE: 35
ADLS_ACUITY_SCORE: 33
ADLS_ACUITY_SCORE: 35

## 2022-11-22 NOTE — ANESTHESIA CARE TRANSFER NOTE
Patient: Ivania Rubi    Procedure: Procedure(s):  CYSTOSCOPY LEFT URETEROSCOPY WITH HOLMIUM LASER LITHOTRIPSY STONE REMOVAL AND STENT PLACEMENT       Diagnosis: Kidney stones [N20.0]  Diagnosis Additional Information: No value filed.    Anesthesia Type:   General     Note:    Oropharynx: oropharynx clear of all foreign objects  Level of Consciousness: awake  Oxygen Supplementation: room air    Independent Airway: airway patency satisfactory and stable  Dentition: dentition unchanged  Vital Signs Stable: post-procedure vital signs reviewed and stable  Report to RN Given: handoff report given  Patient transferred to: PACU    Handoff Report: Identifed the Patient, Identified the Reponsible Provider, Reviewed the pertinent medical history, Discussed the surgical course, Reviewed Intra-OP anesthesia mangement and issues during anesthesia, Set expectations for post-procedure period and Allowed opportunity for questions and acknowledgement of understanding      Vitals:  Vitals Value Taken Time   /68 11/22/22 1005   Temp     Pulse 77 11/22/22 1010   Resp 18 11/22/22 1010   SpO2 92 % 11/22/22 1010   Vitals shown include unvalidated device data.    Electronically Signed By: SHRADDHA Go CRNA  November 22, 2022  10:13 AM

## 2022-11-22 NOTE — INTERVAL H&P NOTE
"I have reviewed the surgical (or preoperative) H&P that is linked to this encounter, and examined the patient. There are no significant changes    Clinical Conditions Present on Arrival:  Clinically Significant Risk Factors Present on Admission                    # Obesity: Estimated body mass index is 31.75 kg/m  as calculated from the following:    Height as of this encounter: 1.676 m (5' 6\").    Weight as of this encounter: 89.2 kg (196 lb 11.2 oz).       "

## 2022-11-22 NOTE — BRIEF OP NOTE
[facility]rology Brief Operative Note    Pre-operative diagnosis: Left Kidney stone [N20.0]   Post-operative diagnosis: Same   Procedure: Procedure(s):  CYSTOSCOPY LEFT URETEROSCOPY WITH HOLMIUM LASER LITHOTRIPSY STONE REMOVAL AND STENT PLACEMENT   Surgeon: ARTURO RESENDEZ MD   Assistant(s): None   Anesthesia: LMA   Estimated blood loss: 5 mL   Total IV fluids: (See anesthesia record)   Blood transfusion: No transfusion was given during surgery   Total urine output: Not measured   Drains: None   Specimens: Left kidney stone fragment   Implants: 5 Fr x 24 cm stent in Left ureter   Findings: 1 cm stone (lower pole)   Complications: None   Condition: Stable   Comments: See dictated operative report for full details

## 2022-11-22 NOTE — OR NURSING
Discharge to home.  Discharge instructions given to pt and pt friend Ofe.  No questions or concerns.  Iv discontinued.  Prescription sent with pt.      Ofe stated she is driving her home and pt daughter Theresa [facility] meet [facility]

## 2022-11-22 NOTE — OR NURSING
Patient states she is going to stay by herself tonight. Ofe, friend , can not stay with her. Left a message for Theresa, daughter to return our call.

## 2022-11-22 NOTE — DISCHARGE INSTRUCTIONS
Same Day Surgery Discharge Instructions for  Sedation and General Anesthesia     It's not unusual to feel dizzy, light-headed or faint for up to 24 hours after surgery or while taking pain medication.  If you have these symptoms: sit for a few minutes before standing and have someone assist you when you get up to walk or use the bathroom.    You should rest and relax for the next 24 hours. We recommend you make arrangements to have an adult stay with you for at least 24 hours after your discharge.  Avoid hazardous and strenuous activity.    DO NOT DRIVE any vehicle or operate mechanical equipment for 24 hours following the end of your surgery.  Even though you may feel normal, your reactions may be affected by the medication you have received.    Do not drink alcoholic beverages for 24 hours following surgery.     Slowly progress to your regular diet as you feel able. It's not unusual to feel nauseated and/or vomit after receiving anesthesia.  If you develop these symptoms, drink clear liquids (apple juice, ginger ale, broth, 7-up, etc. ) until you feel better.  If your nausea and vomiting persists for 24 hours, please notify your surgeon.      All narcotic pain medications, along with inactivity and anesthesia, can cause constipation. Drinking plenty of liquids and increasing fiber intake will help.    For any questions of a medical nature, call your surgeon.    Do not make important decisions for 24 hours.    If you had general anesthesia, you may have a sore throat for a couple of days related to the breathing tube used during surgery.  You may use Cepacol lozenges to help with this discomfort.  If it worsens or if you develop a fever, contact your surgeon.     If you feel your pain is not well managed with the pain medications prescribed by your surgeon, please contact your surgeon's office to let them know so they can address your concerns.      Cystoscopy, Holmium Laser with Stent Placement Discharge  Instructions    Holmium laser lithotripsy was used to break up your kidney stone(s). It is normal to have visible blood in the urine, burning, urgency and frequency following this procedure. These symptoms may last for a few days to weeks.     Diet:  To help pass stone fragments and clear the blood out of the urine, it is important to drink 6-8 glasses of fluids per day at home - at least 3-4 glasses should be water.     Return to the diet that you were on before the procedure, unless you are given specific diet instructions.    Activity:  Walk short distances and increase as your strength allows.  You may climb stairs.  Do not do strenuous exercise or heavy lifting until approved by surgeon.  Do not drive while taking narcotic pain medications.    Bathing:  You may take a shower.          Stent information    During surgery, a stent was placed in the ureter.  The ureter is the tube that drains urine from the kidney to the bladder.  The stent is placed to dilate (open) the ureter so the stone fragments can pass easily through the ureter or to decrease ureteral swelling after surgery, or to relieve an obstruction. The stent is made of rubber. The upper end of the stent curls in the kidney while the lower end rests in the bladder.    While the stent is in place you may experience the following symptoms:  Blood and/or small blood clots in urine.  Bladder spasm (frequency and urgency of urination).  Discomfort or aching in the back or side where the stent is.  Burning or discomfort at the end of urine stream.    To decrease these symptoms you should:  Take pain medication as prescribed.  Drink plenty of fluids.  If you experience pain at the end of urination try not emptying your bladder completely.  If having discomfort in back or side, decrease activity.    Call your physician if these signs/symptoms are present:  Pain that is not relieved by a short rest or ordered pain medications.  Temperature at or above 101.0 F  or chills.  Inability or difficulty urinating.   Excessive blood in urine.  Any questions or concerns.             **If you have questions or concerns about your procedure,   call Dr. Roberts at [phone]**

## 2022-11-22 NOTE — OP NOTE
Procedure Date: 11/22/2022    PREOPERATIVE DIAGNOSIS:  Left kidney stone.    POSTOPERATIVE DIAGNOSIS:  Left kidney stone.    PROCEDURES:  Cystoscopy, left ureteroscopy with laser lithotripsy, stone removal, and left ureteral stent placement.    ASSISTANT:  Dg Roberts M.D.     ANESTHESIA:  General.    ESTIMATED BLOOD LOSS:  Less than 5 mL.    SPECIMENS:  Left kidney stone fragment sent for analysis.    FINDINGS:  A 1 cm stone in lower pole of left kidney.  Her infundibulum are very narrow throughout her kidney.  Renal pelvis was quite small.    INDICATIONS FOR PROCEDURE:  The patient is an 82-year-old female who has a history of kidney stones, urinary incontinence and recurrent urinary tract infections.  She is currently on Myrbetriq 25 mg daily, which does help with the incontinence some.  She had a CT scan performed on 07/23/2022, which showed a 1 cm stone in the lower pole of the left kidney and a 1 mm stone in the lower pole of the right kidney.  After discussing treatment options, she presents now to undergo treatment of her left kidney stone via left ureteroscopy with laser lithotripsy, stone removal and stent placement.  The risks and benefits were discussed with the patient prior to surgery.    DESCRIPTION OF PROCEDURE:  The patient was brought to the operating room and placed in a supine position.  After undergoing general anesthetic, she was placed in a low lithotomy position.  Genitalia was prepped and draped in usual sterile fashion.  A 22-Greek rigid cystoscope was inserted in the bladder.  There were no urethral lesions or strictures.  The trigone and both ureteral orifices were normal in appearance and location.  There were no tumors in the bladder.    A 0.035 Sensor wire was passed up the left ureter with the aid of a 6-Greek ureteral catheter.  After this was placed, a Super Stiff guidewire was advanced up the left ureter with the aid of a 6-Greek ureteral catheter.  These were observed under  fluoroscopy.  The cystoscope was removed.  An 11/13 Dominican ureteral sheath was advanced over the Super Stiff guidewire into the proximal ureter under fluoroscopic guidance.  The Super Stiff guidewire was then removed.    The digital flexible ureteroscope was advanced through the sheath up into the kidney under direct vision.  The kidney was explored.  Note, her renal pelvis is quite small and all her infundibulum are narrow.  There is a 1 cm stone at the bottom of the lower pole toño.  A 200 micron laser was then passed through the ureteroscope.  The holmium laser was used to fragment the stone.  Initial setting was 0.2 joules and a frequency of 50 Hz.  This increased to 0.4 joules and a frequency of 50 Hz.  The stone was dusted into small pieces.  A NCircle basket was used to snag a couple pieces, roughly 1-2 mm in size, and sent for analysis.  The kidney was reexplored.  No large stone fragments were seen.  The entire left ureter was visualized.  No stones were seen in the ureter.  Contrast up in the kidney, which again confirms a small renal pelvis and narrow infundibulum.  There appears to be a small amount of extravasation as well.  The ureteroscope was removed.    The cystoscope was again inserted in the bladder.  A 5-Dominican x 24 cm stent was then placed in the left kidney.  The stent appears to coil in the renal pelvis area.  There was a good curl in the bladder.  The patient's bladder was emptied, cystoscope was removed.  She was put back in a supine position, awoke from anesthesia and transferred to recovery room in good condition.    PLAN:  We will plan to see her back in roughly 1 week to remove the stent in the office.  Would recommend checking a 24-hour urine test in the future to assess risk factors for stone formation.  I will also be addressing her incontinence at that time.  She may benefit from increased dose of Myrbetriq.    Dg Roberts MD        D: 11/22/2022   T: 11/22/2022   MT:  [facility]    Name:     KENRICK WADE  MRN:      [id number]        Account:        [id number]   :      1940           Procedure Date: 2022     Document: [id number]    cc:  Cj Saha MD   [facility]

## 2022-11-22 NOTE — ANESTHESIA PROCEDURE NOTES
Airway       Patient location during procedure: OR  Staff -        Anesthesiologist:  Raad Bills MD       CRNA: Maria M Valente APRN CRNA       Performed By: CRNA  Consent for Airway        Urgency: elective  Indications and Patient Condition       Indications for airway management: amparo-procedural       Induction type:intravenous       Mask difficulty assessment: 0 - not attempted    Final Airway Details       Final airway type: supraglottic airway    Supraglottic Airway Details        Type: LMA       Brand: Ambu AuraGain       LMA size: 4    Post intubation assessment        Placement verified by: capnometry, equal breath sounds and chest rise        Number of attempts at approach: 1       Number of other approaches attempted: 0       Ease of procedure: easy       Dentition: Intact and Unchanged

## 2022-11-22 NOTE — ANESTHESIA POSTPROCEDURE EVALUATION
Patient: Ivania Rubi    Procedure: Procedure(s):  CYSTOSCOPY LEFT URETEROSCOPY WITH HOLMIUM LASER LITHOTRIPSY STONE REMOVAL AND STENT PLACEMENT       Anesthesia Type:  General    Note:  Disposition: Outpatient   Postop Pain Control: Uneventful            Sign Out: Well controlled pain   PONV: No   Neuro/Psych: Uneventful            Sign Out: Acceptable/Baseline neuro status   Airway/Respiratory: Uneventful            Sign Out: Acceptable/Baseline resp. status   CV/Hemodynamics: Uneventful            Sign Out: Acceptable CV status; No obvious hypovolemia; No obvious fluid overload   Other NRE: NONE   DID A NON-ROUTINE EVENT OCCUR? No           Last vitals:  Vitals Value Taken Time   /80 11/22/22 1115   Temp     Pulse 73 11/22/22 1121   Resp 14 11/22/22 1121   SpO2 85 % 11/22/22 1121   Vitals shown include unvalidated device data.    Electronically Signed By: MINDI LUTZ MD  November 22, 2022  2:28 PM

## 2022-11-27 LAB
APPEARANCE STONE: NORMAL
COMPN STONE: NORMAL
SPECIMEN WT: 3 MG

## 2022-12-25 ENCOUNTER — HOSPITAL ENCOUNTER (INPATIENT)
Facility: CLINIC | Age: 82
LOS: 2 days | Discharge: HOME | DRG: 871 | End: 2022-12-27
Attending: EMERGENCY MEDICINE | Admitting: INTERNAL MEDICINE
Payer: MEDICARE

## 2022-12-25 ENCOUNTER — APPOINTMENT (OUTPATIENT)
Dept: GENERAL RADIOLOGY | Facility: CLINIC | Age: 82
DRG: 871 | End: 2022-12-25
Attending: INTERNAL MEDICINE
Payer: MEDICARE

## 2022-12-25 ENCOUNTER — APPOINTMENT (OUTPATIENT)
Dept: CT IMAGING | Facility: CLINIC | Age: 82
DRG: 871 | End: 2022-12-25
Attending: INTERNAL MEDICINE
Payer: MEDICARE

## 2022-12-25 ENCOUNTER — APPOINTMENT (OUTPATIENT)
Dept: GENERAL RADIOLOGY | Facility: CLINIC | Age: 82
DRG: 871 | End: 2022-12-25
Attending: EMERGENCY MEDICINE
Payer: MEDICARE

## 2022-12-25 DIAGNOSIS — A41.9 SEPSIS, DUE TO UNSPECIFIED ORGANISM, UNSPECIFIED WHETHER ACUTE ORGAN DYSFUNCTION PRESENT (H): ICD-10-CM

## 2022-12-25 DIAGNOSIS — M48.061 SPINAL STENOSIS, LUMBAR REGION, WITHOUT NEUROGENIC CLAUDICATION: Primary | ICD-10-CM

## 2022-12-25 LAB
ALBUMIN SERPL-MCNC: 2.4 G/DL (ref 3.4–5)
ALBUMIN UR-MCNC: 70 MG/DL
ALP SERPL-CCNC: 74 U/L (ref 40–150)
ALT SERPL W P-5'-P-CCNC: 17 U/L (ref 0–50)
ANION GAP SERPL CALCULATED.3IONS-SCNC: 5 MMOL/L (ref 3–14)
ANION GAP SERPL CALCULATED.3IONS-SCNC: 6 MMOL/L (ref 3–14)
APPEARANCE UR: ABNORMAL
AST SERPL W P-5'-P-CCNC: 19 U/L (ref 0–45)
BASOPHILS # BLD AUTO: 0.1 10E3/UL (ref 0–0.2)
BASOPHILS NFR BLD AUTO: 0 %
BILIRUB SERPL-MCNC: 0.7 MG/DL (ref 0.2–1.3)
BILIRUB UR QL STRIP: NEGATIVE
BUN SERPL-MCNC: 22 MG/DL (ref 7–30)
BUN SERPL-MCNC: 23 MG/DL (ref 7–30)
CALCIUM SERPL-MCNC: 8.5 MG/DL (ref 8.5–10.1)
CALCIUM SERPL-MCNC: 8.6 MG/DL (ref 8.5–10.1)
CHLORIDE BLD-SCNC: 104 MMOL/L (ref 94–109)
CHLORIDE BLD-SCNC: 105 MMOL/L (ref 94–109)
CO2 SERPL-SCNC: 28 MMOL/L (ref 20–32)
CO2 SERPL-SCNC: 28 MMOL/L (ref 20–32)
COLOR UR AUTO: YELLOW
CREAT SERPL-MCNC: 0.69 MG/DL (ref 0.52–1.04)
CREAT SERPL-MCNC: 0.85 MG/DL (ref 0.52–1.04)
EOSINOPHIL # BLD AUTO: 0 10E3/UL (ref 0–0.7)
EOSINOPHIL NFR BLD AUTO: 0 %
ERYTHROCYTE [DISTWIDTH] IN BLOOD BY AUTOMATED COUNT: 14.4 % (ref 10–15)
ERYTHROCYTE [DISTWIDTH] IN BLOOD BY AUTOMATED COUNT: 14.6 % (ref 10–15)
GFR SERPL CREATININE-BSD FRML MDRD: 69 ML/MIN/1.73M2
GFR SERPL CREATININE-BSD FRML MDRD: 87 ML/MIN/1.73M2
GLUCOSE BLD-MCNC: 100 MG/DL (ref 70–99)
GLUCOSE BLD-MCNC: 139 MG/DL (ref 70–99)
GLUCOSE UR STRIP-MCNC: NEGATIVE MG/DL
HCO3 BLDV-SCNC: 28 MMOL/L (ref 21–28)
HCT VFR BLD AUTO: 35.3 % (ref 35–47)
HCT VFR BLD AUTO: 37.1 % (ref 35–47)
HGB BLD-MCNC: 11.5 G/DL (ref 11.7–15.7)
HGB BLD-MCNC: 12.4 G/DL (ref 11.7–15.7)
HGB UR QL STRIP: ABNORMAL
HYALINE CASTS: 15 /LPF
IMM GRANULOCYTES # BLD: 0.2 10E3/UL
IMM GRANULOCYTES NFR BLD: 1 %
KETONES UR STRIP-MCNC: NEGATIVE MG/DL
LACTATE BLD-SCNC: 0.5 MMOL/L
LEUKOCYTE ESTERASE UR QL STRIP: ABNORMAL
LYMPHOCYTES # BLD AUTO: 2 10E3/UL (ref 0.8–5.3)
LYMPHOCYTES NFR BLD AUTO: 11 %
MCH RBC QN AUTO: 30.5 PG (ref 26.5–33)
MCH RBC QN AUTO: 31.2 PG (ref 26.5–33)
MCHC RBC AUTO-ENTMCNC: 32.6 G/DL (ref 31.5–36.5)
MCHC RBC AUTO-ENTMCNC: 33.4 G/DL (ref 31.5–36.5)
MCV RBC AUTO: 93 FL (ref 78–100)
MCV RBC AUTO: 94 FL (ref 78–100)
MONOCYTES # BLD AUTO: 1.5 10E3/UL (ref 0–1.3)
MONOCYTES NFR BLD AUTO: 8 %
MUCOUS THREADS #/AREA URNS LPF: PRESENT /LPF
NEUTROPHILS # BLD AUTO: 14.6 10E3/UL (ref 1.6–8.3)
NEUTROPHILS NFR BLD AUTO: 80 %
NITRATE UR QL: NEGATIVE
NRBC # BLD AUTO: 0 10E3/UL
NRBC BLD AUTO-RTO: 0 /100
PCO2 BLDV: 52 MM HG (ref 40–50)
PH BLDV: 7.33 [PH] (ref 7.32–7.43)
PH UR STRIP: 5.5 [PH] (ref 5–7)
PLATELET # BLD AUTO: 190 10E3/UL (ref 150–450)
PLATELET # BLD AUTO: 206 10E3/UL (ref 150–450)
PO2 BLDV: 46 MM HG (ref 25–47)
POTASSIUM BLD-SCNC: 3.2 MMOL/L (ref 3.4–5.3)
POTASSIUM BLD-SCNC: 3.4 MMOL/L (ref 3.4–5.3)
POTASSIUM BLD-SCNC: 3.4 MMOL/L (ref 3.4–5.3)
PROT SERPL-MCNC: 5.6 G/DL (ref 6.8–8.8)
RBC # BLD AUTO: 3.77 10E6/UL (ref 3.8–5.2)
RBC # BLD AUTO: 3.98 10E6/UL (ref 3.8–5.2)
RBC URINE: 13 /HPF
SAO2 % BLDV: 78 % (ref 94–100)
SARS-COV-2 RNA RESP QL NAA+PROBE: NEGATIVE
SODIUM SERPL-SCNC: 138 MMOL/L (ref 133–144)
SODIUM SERPL-SCNC: 138 MMOL/L (ref 133–144)
SP GR UR STRIP: 1.02 (ref 1–1.03)
SQUAMOUS EPITHELIAL: <1 /HPF
TROPONIN I SERPL HS-MCNC: 28 NG/L
TROPONIN I SERPL HS-MCNC: 32 NG/L
TROPONIN I SERPL HS-MCNC: 41 NG/L
TROPONIN I SERPL HS-MCNC: 66 NG/L
TSH SERPL DL<=0.005 MIU/L-ACNC: 3.01 MU/L (ref 0.4–4)
UROBILINOGEN UR STRIP-MCNC: NORMAL MG/DL
WBC # BLD AUTO: 16.1 10E3/UL (ref 4–11)
WBC # BLD AUTO: 18.3 10E3/UL (ref 4–11)
WBC CLUMPS #/AREA URNS HPF: PRESENT /HPF
WBC URINE: >182 /HPF

## 2022-12-25 PROCEDURE — 87040 BLOOD CULTURE FOR BACTERIA: CPT | Performed by: EMERGENCY MEDICINE

## 2022-12-25 PROCEDURE — 96365 THER/PROPH/DIAG IV INF INIT: CPT

## 2022-12-25 PROCEDURE — 96361 HYDRATE IV INFUSION ADD-ON: CPT

## 2022-12-25 PROCEDURE — 81003 URINALYSIS AUTO W/O SCOPE: CPT | Performed by: EMERGENCY MEDICINE

## 2022-12-25 PROCEDURE — 250N000013 HC RX MED GY IP 250 OP 250 PS 637: Performed by: INTERNAL MEDICINE

## 2022-12-25 PROCEDURE — 258N000003 HC RX IP 258 OP 636: Performed by: INTERNAL MEDICINE

## 2022-12-25 PROCEDURE — G1010 CDSM STANSON: HCPCS

## 2022-12-25 PROCEDURE — 36415 COLL VENOUS BLD VENIPUNCTURE: CPT | Performed by: INTERNAL MEDICINE

## 2022-12-25 PROCEDURE — 84484 ASSAY OF TROPONIN QUANT: CPT | Performed by: INTERNAL MEDICINE

## 2022-12-25 PROCEDURE — 258N000003 HC RX IP 258 OP 636: Performed by: EMERGENCY MEDICINE

## 2022-12-25 PROCEDURE — 120N000001 HC R&B MED SURG/OB

## 2022-12-25 PROCEDURE — 99223 1ST HOSP IP/OBS HIGH 75: CPT | Mod: AI | Performed by: INTERNAL MEDICINE

## 2022-12-25 PROCEDURE — 36415 COLL VENOUS BLD VENIPUNCTURE: CPT | Performed by: EMERGENCY MEDICINE

## 2022-12-25 PROCEDURE — 85025 COMPLETE CBC W/AUTO DIFF WBC: CPT | Performed by: EMERGENCY MEDICINE

## 2022-12-25 PROCEDURE — 85027 COMPLETE CBC AUTOMATED: CPT | Performed by: INTERNAL MEDICINE

## 2022-12-25 PROCEDURE — 99291 CRITICAL CARE FIRST HOUR: CPT | Mod: 25

## 2022-12-25 PROCEDURE — U0005 INFEC AGEN DETEC AMPLI PROBE: HCPCS | Performed by: EMERGENCY MEDICINE

## 2022-12-25 PROCEDURE — 80053 COMPREHEN METABOLIC PANEL: CPT | Performed by: EMERGENCY MEDICINE

## 2022-12-25 PROCEDURE — 71046 X-RAY EXAM CHEST 2 VIEWS: CPT

## 2022-12-25 PROCEDURE — C9803 HOPD COVID-19 SPEC COLLECT: HCPCS

## 2022-12-25 PROCEDURE — 82040 ASSAY OF SERUM ALBUMIN: CPT | Performed by: INTERNAL MEDICINE

## 2022-12-25 PROCEDURE — 87086 URINE CULTURE/COLONY COUNT: CPT | Performed by: EMERGENCY MEDICINE

## 2022-12-25 PROCEDURE — 250N000011 HC RX IP 250 OP 636: Performed by: EMERGENCY MEDICINE

## 2022-12-25 PROCEDURE — 84484 ASSAY OF TROPONIN QUANT: CPT | Performed by: EMERGENCY MEDICINE

## 2022-12-25 PROCEDURE — 84132 ASSAY OF SERUM POTASSIUM: CPT | Performed by: INTERNAL MEDICINE

## 2022-12-25 PROCEDURE — 84443 ASSAY THYROID STIM HORMONE: CPT | Performed by: INTERNAL MEDICINE

## 2022-12-25 PROCEDURE — 82803 BLOOD GASES ANY COMBINATION: CPT

## 2022-12-25 PROCEDURE — 72100 X-RAY EXAM L-S SPINE 2/3 VWS: CPT

## 2022-12-25 RX ORDER — POTASSIUM CHLORIDE 1500 MG/1
40 TABLET, EXTENDED RELEASE ORAL ONCE
Status: COMPLETED | OUTPATIENT
Start: 2022-12-25 | End: 2022-12-25

## 2022-12-25 RX ORDER — CITALOPRAM HYDROBROMIDE 20 MG/1
20 TABLET ORAL EVERY MORNING
Status: DISCONTINUED | OUTPATIENT
Start: 2022-12-25 | End: 2022-12-27 | Stop reason: HOSPADM

## 2022-12-25 RX ORDER — PROCHLORPERAZINE 25 MG
12.5 SUPPOSITORY, RECTAL RECTAL EVERY 12 HOURS PRN
Status: DISCONTINUED | OUTPATIENT
Start: 2022-12-25 | End: 2022-12-27 | Stop reason: HOSPADM

## 2022-12-25 RX ORDER — SENNOSIDES 8.6 MG
1300 CAPSULE ORAL 2 TIMES DAILY PRN
Status: DISCONTINUED | OUTPATIENT
Start: 2022-12-25 | End: 2022-12-25

## 2022-12-25 RX ORDER — ACETAMINOPHEN 650 MG/1
650 SUPPOSITORY RECTAL EVERY 6 HOURS PRN
Status: DISCONTINUED | OUTPATIENT
Start: 2022-12-25 | End: 2022-12-27 | Stop reason: HOSPADM

## 2022-12-25 RX ORDER — NALOXONE HYDROCHLORIDE 0.4 MG/ML
0.2 INJECTION, SOLUTION INTRAMUSCULAR; INTRAVENOUS; SUBCUTANEOUS
Status: DISCONTINUED | OUTPATIENT
Start: 2022-12-25 | End: 2022-12-27 | Stop reason: HOSPADM

## 2022-12-25 RX ORDER — LIDOCAINE 40 MG/G
CREAM TOPICAL
Status: DISCONTINUED | OUTPATIENT
Start: 2022-12-25 | End: 2022-12-27 | Stop reason: HOSPADM

## 2022-12-25 RX ORDER — CEFTRIAXONE 2 G/1
2 INJECTION, POWDER, FOR SOLUTION INTRAMUSCULAR; INTRAVENOUS EVERY 24 HOURS
Status: DISCONTINUED | OUTPATIENT
Start: 2022-12-26 | End: 2022-12-27

## 2022-12-25 RX ORDER — METHIMAZOLE 5 MG/1
5 TABLET ORAL DAILY
Status: DISCONTINUED | OUTPATIENT
Start: 2022-12-25 | End: 2022-12-27 | Stop reason: HOSPADM

## 2022-12-25 RX ORDER — SENNOSIDES 8.6 MG
1300 CAPSULE ORAL 2 TIMES DAILY PRN
COMMUNITY

## 2022-12-25 RX ORDER — ONDANSETRON 2 MG/ML
4 INJECTION INTRAMUSCULAR; INTRAVENOUS EVERY 6 HOURS PRN
Status: DISCONTINUED | OUTPATIENT
Start: 2022-12-25 | End: 2022-12-27 | Stop reason: HOSPADM

## 2022-12-25 RX ORDER — PROCHLORPERAZINE MALEATE 5 MG
5 TABLET ORAL EVERY 6 HOURS PRN
Status: DISCONTINUED | OUTPATIENT
Start: 2022-12-25 | End: 2022-12-27 | Stop reason: HOSPADM

## 2022-12-25 RX ORDER — CEFTRIAXONE 2 G/1
2 INJECTION, POWDER, FOR SOLUTION INTRAMUSCULAR; INTRAVENOUS ONCE
Status: COMPLETED | OUTPATIENT
Start: 2022-12-25 | End: 2022-12-25

## 2022-12-25 RX ORDER — ONDANSETRON 4 MG/1
4 TABLET, ORALLY DISINTEGRATING ORAL EVERY 6 HOURS PRN
Status: DISCONTINUED | OUTPATIENT
Start: 2022-12-25 | End: 2022-12-27 | Stop reason: HOSPADM

## 2022-12-25 RX ORDER — ACETAMINOPHEN 325 MG/1
650 TABLET ORAL EVERY 6 HOURS PRN
Status: DISCONTINUED | OUTPATIENT
Start: 2022-12-25 | End: 2022-12-27 | Stop reason: HOSPADM

## 2022-12-25 RX ORDER — AMOXICILLIN 250 MG
2 CAPSULE ORAL 2 TIMES DAILY PRN
Status: DISCONTINUED | OUTPATIENT
Start: 2022-12-25 | End: 2022-12-27 | Stop reason: HOSPADM

## 2022-12-25 RX ORDER — POLYETHYLENE GLYCOL 3350 17 G/17G
17 POWDER, FOR SOLUTION ORAL DAILY PRN
Status: DISCONTINUED | OUTPATIENT
Start: 2022-12-25 | End: 2022-12-27 | Stop reason: HOSPADM

## 2022-12-25 RX ORDER — ONDANSETRON 4 MG/1
4-8 TABLET, ORALLY DISINTEGRATING ORAL EVERY 8 HOURS PRN
COMMUNITY

## 2022-12-25 RX ORDER — NALOXONE HYDROCHLORIDE 0.4 MG/ML
0.4 INJECTION, SOLUTION INTRAMUSCULAR; INTRAVENOUS; SUBCUTANEOUS
Status: DISCONTINUED | OUTPATIENT
Start: 2022-12-25 | End: 2022-12-27 | Stop reason: HOSPADM

## 2022-12-25 RX ORDER — AMOXICILLIN 250 MG
1 CAPSULE ORAL 2 TIMES DAILY PRN
Status: DISCONTINUED | OUTPATIENT
Start: 2022-12-25 | End: 2022-12-27 | Stop reason: HOSPADM

## 2022-12-25 RX ORDER — BISACODYL 10 MG
10 SUPPOSITORY, RECTAL RECTAL DAILY PRN
Status: DISCONTINUED | OUTPATIENT
Start: 2022-12-25 | End: 2022-12-27 | Stop reason: HOSPADM

## 2022-12-25 RX ORDER — CEFDINIR 300 MG/1
300 CAPSULE ORAL 2 TIMES DAILY
COMMUNITY

## 2022-12-25 RX ORDER — PANTOPRAZOLE SODIUM 40 MG/1
40 TABLET, DELAYED RELEASE ORAL 2 TIMES DAILY
Status: DISCONTINUED | OUTPATIENT
Start: 2022-12-25 | End: 2022-12-27 | Stop reason: HOSPADM

## 2022-12-25 RX ORDER — ATORVASTATIN CALCIUM 40 MG/1
40 TABLET, FILM COATED ORAL DAILY
Status: DISCONTINUED | OUTPATIENT
Start: 2022-12-25 | End: 2022-12-27 | Stop reason: HOSPADM

## 2022-12-25 RX ADMIN — ACETAMINOPHEN 650 MG: 325 TABLET, FILM COATED ORAL at 14:44

## 2022-12-25 RX ADMIN — CEFTRIAXONE SODIUM 2 G: 2 INJECTION, POWDER, FOR SOLUTION INTRAMUSCULAR; INTRAVENOUS at 02:25

## 2022-12-25 RX ADMIN — SODIUM CHLORIDE 1000 ML: 9 INJECTION, SOLUTION INTRAVENOUS at 08:54

## 2022-12-25 RX ADMIN — PANTOPRAZOLE SODIUM 40 MG: 40 TABLET, DELAYED RELEASE ORAL at 13:23

## 2022-12-25 RX ADMIN — POTASSIUM CHLORIDE 40 MEQ: 1500 TABLET, EXTENDED RELEASE ORAL at 11:09

## 2022-12-25 RX ADMIN — SODIUM CHLORIDE 1000 ML: 9 INJECTION, SOLUTION INTRAVENOUS at 01:10

## 2022-12-25 RX ADMIN — ATORVASTATIN CALCIUM 40 MG: 40 TABLET, FILM COATED ORAL at 20:24

## 2022-12-25 RX ADMIN — POTASSIUM CHLORIDE 40 MEQ: 1500 TABLET, EXTENDED RELEASE ORAL at 20:21

## 2022-12-25 RX ADMIN — METHIMAZOLE 5 MG: 5 TABLET ORAL at 14:39

## 2022-12-25 RX ADMIN — PANTOPRAZOLE SODIUM 40 MG: 40 TABLET, DELAYED RELEASE ORAL at 20:24

## 2022-12-25 RX ADMIN — CITALOPRAM HYDROBROMIDE 20 MG: 20 TABLET ORAL at 13:23

## 2022-12-25 ASSESSMENT — ACTIVITIES OF DAILY LIVING (ADL)
ADLS_ACUITY_SCORE: 32
ADLS_ACUITY_SCORE: 25
ADLS_ACUITY_SCORE: 32
ADLS_ACUITY_SCORE: 29
ADLS_ACUITY_SCORE: 35
ADLS_ACUITY_SCORE: 35
ADLS_ACUITY_SCORE: 32
ADLS_ACUITY_SCORE: 35
ADLS_ACUITY_SCORE: 32
ADLS_ACUITY_SCORE: 29

## 2022-12-25 ASSESSMENT — ENCOUNTER SYMPTOMS: SHORTNESS OF BREATH: 0

## 2022-12-25 NOTE — PLAN OF CARE
Goal Outcome Evaluation:    DATE & TIME: 12/25/22, [id number]  Summary: Fell in parking lot, found down 20 mins later- frost nip and hypotensive . UTI  Cognitive Concerns/ Orientation : A&O x 4   BEHAVIOR & AGGRESSION TOOL COLOR: GREEN  ABNL VS/O2: VSS on RA  MOBILITY: SBA with walker  PAIN MANAGMENT: C/o mild discomfort to hands and fingers from fall  DIET: Regular  BOWEL/BLADDER: Incont at times of bladder- baseline per pt  ABNL LAB/BG: K 3.2- replaced- recheck at 1547  DRAIN/DEVICES: R PIV SL   SKIN: Bruising and blisters to both hands and fingers   TESTS/PROCEDURES: CT during shift- no abnormal findings   D/C DAY/GOALS/PLACE: TBD- pending cultures and antibiotics

## 2022-12-25 NOTE — H&P
[facility]    History and Physical - Hospitalist Service       Date of Admission:  12/25/2022    Assessment & Plan      Ivania Rubi is a 82 year old female admitted on 12/25/2022. She presents to the emergency department in the setting of a known urinary tract infection recently evaluated at outside emergency department after a fall, and weakness limiting her ability to get up.  Brought to the emergency department and found to have sepsis associated with her urinary tract infection.    Severe sepsis with early septic shock: In the setting of urinary tract infection and suspected gram-negative bacteremia.  Patient with fever of 101 at home, rigors since Friday, septic encephalopathy, leukocytosis to 18,000+.  Multiple blood pressures reading in the mid 80s systolic range despite fluid administration in the emergency department.  Septic encephalopathy: Patient with overt mental slowing, nonfocal.  Tangential with concentration deficits.  Suspect secondary to gram-negative bacteremia  -1 L normal saline bolus.  -CT abdomen pelvis performed at [facility] department 12/24 without evidence of obstructing stone; not repeating imaging at this time.  -2 g ceftriaxone every 24 hours for sepsis with suspected ascending urinary tract infection  -Noncontrast head CT to evaluate for nonfocal mental slowing and headache; rule out subdural after fall.  Generally low suspicion for this  -Additional 1 L normal saline bolus  -Trend orthostatic blood pressure and pulse, page provider if abnormal for additional fluid orders  -Intake and output  -Holding prior to admission antihypertensives including lisinopril hydrochlorothiazide commendation    Falls frequently: Patient reports that she has approximately 6 falls per year.  Several weeks ago with a fall onto her buttocks with bruising and pain with sitting.  Tonight with a fall where she caught her toe on a chunk of ice and fell.  Lives  "independently in a condo.  -Physical therapy consulted  -Fall precautions    Non-ST elevation myocardial infarction: Troponin elevated at 66.  Suspect secondary to demand ischemia in the setting of sepsis with hypotension.    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  -Resume prior to admission methimazole when reconciled by pharmacy  -TSH, T4 pending    Hyperlipidemia:  -Resume prior to admission Lipitor when reconciled by pharmacy    GERD:  -Can resume prior to admission Prilosec when reconciled by pharmacy    Chronic back pain:  -Has a spinal stimulator in place  -As needed acetaminophen, low-dose oxycodone available as needed  -X-ray lumbar spine for recent fall with bruising over left decubitus and ongoing pain with sitting       Diet:  Advance diet as tolerated to regular adult diet  DVT Prophylaxis: Pneumatic compression devices  Dunn Catheter: Not present  Central Lines: None  Cardiac Monitoring: None  Code Status:    Full code.  Confirmed with patient on admission    Clinically Significant Risk Factors Present on Admission                  # Hypertension: home medication list includes antihypertensive(s)   # Acute Respiratory Failure: Documented O2 saturation < 91%.  Continue supplemental oxygen as needed     # Obesity: Estimated body mass index is 30.67 kg/m  as calculated from the following:    Height as of this encounter: 1.676 m (5' 6\").    Weight as of this encounter: 86.2 kg (190 lb).           Disposition Plan      Expected Discharge Date: 12/27/2022                The patient's care was discussed with the Patient and Dr Her in the emergency department.  Discussed also with [facility] emergency department staff, who are able to confirm that a urine culture was sent during her 12/24 visit prior to initiation of anti-infectives.  No blood cultures at that time.  Contact information for eighth floor provided for when urine culture results if patient is still hospitalized.    Mike Yan " MD [place]  Hospitalist Service  [facility]  Securely message with the [employer] Web Console (learn more here)  Text page via [employer] Paging/Directory         ______________________________________________________________________    Chief Complaint   Weakness, nausea with retching, dysuria, shaking chills, fever    History is obtained from patient, chart review, discussion with ER provider, review of outside records including ER visit from [place] emergency department 12/24/2022 AM with concern for urinary tract infection and abnormal urinalysis, CT abdomen pelvis report without obstructing stone.    History of Present Illness   Ivania Rubi is a 82 year old female who is brought to [facility] emergency department after she had a fall in the parking lot of her condo.    Patient lives independently, still drives.  She has a history of frequent urinary tract infections as well as nephrolithiasis.  [facility] and had lithotripsy and stent placement this past November.  Stent subsequently removed in clinic.    This Friday, patient was at a friend's house watching their dogs.  When friend returned home, patient appeared unwell to the point where the friend suggested she be evaluated at a hospital.  Ivania describes having shaking chills at that time.  She tells me that when she has a urine infection she will have these shaking chills.  I discussed that this often represents bacteria or virus in the bloodstream, and encouraged her to seek care when this occurs.  Patient was not felt safe to drive on Friday, her daughter picked her up and she spent the night at her daughter's home.  She was feeling somewhat better, so she decided to sleep overnight.  When she still did not feel well in the morning and was experiencing nausea and vomiting, again consistent with her prior urinary tract infections, she was brought to [place] emergency department as  this was near her daughter's home.  In the emergency department patient was found to have leukocytosis to the 16,000 range, and abnormal urinalysis.  With her history of renal stones, a CT scan was performed demonstrating no evidence of obstructive stone, no report of pyelonephritis.  She did have some nonobstructing stones in the left kidney.  Patient was offered admission, though preferred discharge for the holiday, and was sent home on Omnicef.  I confirmed with [place] emergency department that a urine culture was sent prior to anti-infectives.    Ivania tells me that she went home to her apartment, took a nap until the late afternoon.  In the evening she went to her daughter's home to open [employer] presents and have dinner.  Tells me she really only had 2 bites of dinner as she is still having nausea.  Patient tells me that she remained fatigued and tired, and left early in the evening to drive her autistic adult son to his home, and go home to bed herself.  She was able to drop her son off, though when she arrived in the parking lot at her condo, she ended up having a fall as she exited her car.  Apparently, her dog, though with a leash and collar on, jumped out of the door as she opened it.  She went to get her dog, and she stubbed her toe on a chunk of frozen ice and fell forward.  She reports mostly catching herself with her hands and her wrists on a snow bank, though she did hit her face and bent her glasses somewhat.  She was unable to stand up, though she was able to roll onto her buttocks and scoot towards the car.  She did not have any focal pain, though was too weak to pull herself up either by grasping the hub caps of her car or by pulling on her door handle to get up.  Fortunately, some neighbors on the fourth floor of the [facility] complex saw her fall and came out after they put warm clothes on.  Patient estimates that she was struggling to get up for approximately 15 minutes.  Another neighbor  came down, and together they all helped her to her apartment and collected her dog.  EMS was subsequently contacted and noted patient to be hypotensive and recommended transport to the hospital.  On arrival, patient hypotensive in the 80s systolic range.  She received IV saline bolus, though remained hypotensive.  Though she is afebrile here currently, her white count has increased from the 16,000 range now to the 18,000 range.  She describes shaking chills and fever of 101 at home, since at least this Friday.    Repeat urinalysis, urine culture sent.  Blood culture sent as well.  Received 2 g ceftriaxone with request for admission.    In meeting with patient, she has no pain currently.  She reports that she did have a headache, though this is now resolved.  She has generalized mental slowing.  When I asked her if her daughter was concerned about this earlier in the evening, she tells me no.  I described my concern for her mental status in terms of I would not want her driving if she were my family member in this state, and she is in agreement, recognizing that her mental processing is not at baseline.  No focal neurologic deficits are appreciated.  Patient reports some buttock pain when sitting, tells me that she fell approximately 2 weeks ago.  She reports that she has somewhat frequent falls.  She cannot tell me exactly how often she has falls, though believes it is more than 6 times per year.    History of lithotripsy and ureteral stent placement, stent subsequently removed in clinic.  [facility].  She describes a test that she was supposed to complete through urology clinic to assess her frequent urinary tract infections, though is uncertain what this was, and tells me that it has not yet been ordered.  In review of chart, it appears that there was a plan for 24-hour urine collection to assess her renal stones.  I anticipate that this is what she is referring to.  Stone  fragment sent 11/22/2022 from lithotripsy was calcium oxalate monohydrate.    Review of Systems    The 10 point Review of Systems is negative other than noted in the HPI or here.   + for nausea with vomiting, headache, urinary incontinence, urinary frequency, shaking chills, fever  - for diarrhea, shortness of breath, cough    Past Medical History    I have reviewed this patient's medical history and updated it with pertinent information if needed.   Past Medical History:   Diagnosis Date     Allergic rhinitis      Anemia      Arthritis     osteo arthritis     Depressive disorder, not elsewhere classified      Diarrhea      Displacement of cervical intervertebral disc without myelopathy      Gastroesophageal reflux disease      Hypotension, unspecified      Hypovitaminosis D      Kidney stone      Nontoxic multinodular goiter      Obesity      PONV (postoperative nausea and vomiting)      Pure hypercholesterolemia      Rosacea      Spinal stenosis, lumbar region, without neurogenic claudication      Symptomatic menopausal or female climacteric states      Uncomplicated asthma      Unspecified asthma(493.90)      Unspecified essential hypertension        Past Surgical History   I have reviewed this patient's surgical history and updated it with pertinent information if needed.  Past Surgical History:   Procedure Laterality Date     ARTHRODESIS FOOT       BACK SURGERY      cervical spinal fusion     BIOPSY      Thyroid     CARPAL TUNNEL RELEASE RT/LT       ENT SURGERY      T and A     GYN SURGERY      abdominal hysterectomy     LASER HOLMIUM LITHOTRIPSY URETER(S), INSERT STENT, COMBINED Left 11/22/2022    Procedure: CYSTOSCOPY LEFT URETEROSCOPY WITH HOLMIUM LASER LITHOTRIPSY STONE REMOVAL AND STENT PLACEMENT;  Surgeon: Dg Roberts MD;  Location:  OR     ORTHOPEDIC SURGERY      tka right and left     ORTHOPEDIC SURGERY      knee scopes     ORTHOPEDIC SURGERY      bunionectomy rt and left      PHACOEMULSIFICATION CLEAR CORNEA WITH STANDARD INTRAOCULAR LENS IMPLANT  2/5/2014    Procedure: PHACOEMULSIFICATION CLEAR CORNEA WITH STANDARD INTRAOCULAR LENS IMPLANT;  RIGHT PHACOEMULSIFICATION CLEAR CORNEA WITH STANDARD INTRAOCULAR LENS IMPLANT ;  Surgeon: Marciano Zavala MD;  Location:  [facility]     PHACOEMULSIFICATION CLEAR CORNEA WITH STANDARD INTRAOCULAR LENS IMPLANT  2/12/2014    Procedure: PHACOEMULSIFICATION CLEAR CORNEA WITH STANDARD INTRAOCULAR LENS IMPLANT;  LEFT PHACOEMULSIFICATION CLEAR CORNEA WITH STANDARD INTRAOCULAR LENS IMPLANT ;  Surgeon: Marciano Zavala MD;  Location:  [facility]     RELEASE TRIGGER FINGER       REVERSE ARTHROPLASTY SHOULDER Right 4/3/2017    Procedure: REVERSE ARTHROPLASTY SHOULDER;  Surgeon: Kevin Mc MD;  Location:  OR     VITRECTOMY PARSPLANA WITH 25 GAUGE SYSTEM Left 10/25/2017    Procedure: VITRECTOMY PARSPLANA WITH 25 GAUGE SYSTEM;  LEFT EYE VITRECTOMY PARSPLANA WITH 25 GAUGE SYSTEM ; MEMBRANE PEEL ; AIR FLUID EXCHANGE;  Surgeon: Kevin Melton MD;  Location: [facility]     ZZC LUMBAR SPINE FUSION,ANTER APPRCH         Social History   I have reviewed this patient's social history and updated it with pertinent information if needed.  Social History     Tobacco Use     Smoking status: Never     Passive exposure: Current (very rare; Pt reports Son is Smoker.)     Smokeless tobacco: Never   Vaping Use     Vaping Use: Never used   Substance Use Topics     Alcohol use: Yes     Comment: rare; wine twice a year     Drug use: No       Family History     Father with heart disease, bypass grafting in his 60s.    Prior to Admission Medications   Prior to Admission Medications   Prescriptions Last Dose Informant Patient Reported? Taking?   Aspirin-Caffeine 500-32.5 MG TABS  Self Yes No   Sig: Take 2 tablets by mouth daily as needed   Atorvastatin Calcium (LIPITOR PO)  Self Yes No   Sig: Take 40 mg by mouth daily    CALCIUM PO  Self Yes No   Sig: Take 1 tablet by mouth 2  times daily   Cholecalciferol (VITAMIN D-3 PO)  Self Yes No   Sig: Take 1 tablet by mouth every evening    HYDROcodone-acetaminophen (NORCO) 5-325 MG tablet   No No   Sig: Take 1-2 tablets by mouth every 4 hours as needed for moderate to severe pain   Multiple Vitamins-Minerals (CENTRUM SILVER) per tablet  Self Yes No   Sig: Take 1 tablet by mouth daily   Omega-3 Fatty Acids (FISH OIL PO)  Self Yes No   Sig: Take 1 capsule by mouth 2 times daily   albuterol (PROAIR HFA/PROVENTIL HFA/VENTOLIN HFA) 108 (90 Base) MCG/ACT inhaler   No No   Sig: Inhale 2 puffs into the lungs every 2 hours as needed for shortness of breath / dyspnea or wheezing   cetirizine (ZYRTEC) 10 MG tablet   Yes No   Sig: Take 10 mg by mouth daily   citalopram (CELEXA) 20 MG tablet  Self Yes No   Sig: Take 20 mg by mouth every morning    famotidine (PEPCID) 40 MG tablet   Yes No   Sig: Take 40 mg by mouth At Bedtime   lactobacillus rhamnosus, GG, (CULTURELL) capsule   Yes No   Sig: Take 1 capsule by mouth 2 times daily   lisinopril-hydrochlorothiazide (PRINZIDE/ZESTORETIC) 20-12.5 MG per tablet  Self Yes No   Sig: Take 1 tablet by mouth daily   methimazole (TAPAZOLE) 5 MG tablet  Self Yes No   Sig: Take 5 mg by mouth daily    mirabegron (MYRBETRIQ) 25 MG 24 hr tablet  Self Yes No   Sig: Take 25 mg by mouth daily   omeprazole (PRILOSEC) 20 MG capsule  Self Yes No   Sig: Take 20 mg by mouth every morning       Facility-Administered Medications: None     Allergies   Allergies   Allergen Reactions     Oxybutinin Chloride [Oxybutynin] Hives       Physical Exam   Vital Signs: Temp: 97.3  F (36.3  C) Temp src: Temporal BP: (!) 87/55 Pulse: 63   Resp: 16 SpO2: 93 % O2 Device: Nasal cannula Oxygen Delivery: 1 LPM  Weight: 190 lbs 0 oz    General Appearance: Obese, though generally robust for age 82-year-old female resting comfortably in bed.  She is in no acute distress.  Eyes: No scleral icterus or injection  HEENT: Normocephalic and atraumatic.  Eyeglasses  are slightly bent which patient attributes to recent fall  Respiratory: Breath sounds are clear bilaterally to auscultation with no wheezes, no crackles  Cardiovascular: Regular rate and rhythm with heart rate in the mid 60s range.  2/6 mid peaking systolic murmur best appreciated at right upper sternal border.  Not harsh in nature.  GI: Isac obese, soft, nontender to palpation.  Patient reports an urge to urinate with suprapubic palpation, though bladder itself is not palpable as a discrete entity.  Lymph/Hematologic: No pitting lower extremity edema  Genitourinary: No CVA tenderness to percussion.  No palpable bladder  Skin: Patient with bruising of left buttock medial to piriformis  Musculoskeletal: Muscular tone and bulk intact and appropriate, even robust for age.  Neurologic: Tangential in the difficulty focusing consistent with encephalopathy.  Nonfocal.  Psychiatric: Very pleasant, normal affect    Data   Data reviewed today: I reviewed all medications, new labs and imaging results over the last 24 hours. I personally reviewed the EKG tracing showing Normal sinus rhythm.    Recent Labs   Lab 12/25/22  0034   WBC 18.3*   HGB 12.4   MCV 93         POTASSIUM 3.4   CHLORIDE 104   CO2 28   BUN 23   CR 0.85   ANIONGAP 6   CASTILLO 8.6   *

## 2022-12-25 NOTE — PLAN OF CARE
Pt came up at around 0430. A&Ox4. VSS on RA. Skin- blister on R middle finger from being in the snow. Reg Diet. Expected discharge 12/27/2022.

## 2022-12-25 NOTE — PROGRESS NOTES
[facility]    Medicine Progress Note - Hospitalist Service    Date of Admission:  12/25/2022    Assessment & Plan   Ivania Rubi is a 82 year old female admitted on 12/25/2022. She presents to the emergency department in the setting of a known urinary tract infection recently evaluated at outside emergency department after a fall, and weakness limiting her ability to get up.  Brought to the emergency department and found to have sepsis associated with her urinary tract infection.     Severe sepsis with early septic shock: In the setting of urinary tract infection  Septic encephalopathy    Patient with fever of 101 at home, rigors since Friday, septic encephalopathy, leukocytosis to 18,000+.  Multiple blood pressures reading arrival in the mid 80s systolic range despite fluid administration in the emergency department.  -CT abdomen pelvis performed at [facility] department 12/24 multiple small nonobstructing stone within the lower pole of left kidney likely due to lithotripsy stent, no evidence of hydronephrosis   -Head CT negative for any acute intracranial abnormality  -UA grossly abnormal, urine culture/blood culture pending  -Continue 2 g ceftriaxone every 24 hours started during admission  -Holding prior to admission antihypertensives including lisinopril hydrochlorothiazide      Hypokalemia  -Replace per protocol    Frequent falls: Patient reports that she has approximately 6 falls per year.  Several weeks ago with a fall onto her buttocks with bruising and pain with sitting.  Tonight with a fall where she caught her toe on a chunk of ice and fell.  Lives independently in a condo.  -Does have some blisters on her hand which likely secondary to cold  -Physical therapy consulted  -Fall precautions     Non-ST elevation myocardial infarction, type II: Troponin elevated at 66.  Suspect secondary to demand ischemia in the setting of sepsis with hypotension.  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  -Resume prior to admission methimazole   -TSH normal     Hyperlipidemia:  -Resume prior to admission Lipitor      GERD:  -Resume prior to admission Prilosec      Chronic back pain:  -Has a spinal stimulator in place  -As needed acetaminophen, low-dose oxycodone available as needed  -X-ray lumbar spine negative for any fracture/acute abnormality     Diet: Combination Diet Regular Diet Adult    DVT Prophylaxis: Pneumatic Compression Devices  Dunn Catheter: Not present  Central Lines: None  Cardiac Monitoring: None  Code Status: Full Code      Disposition Plan      Expected Discharge Date: 12/29/2022                The patient's care was discussed with the Bedside Nurse and Patient.    Sunita Leal MD  Hospitalist Service  [facility]  Securely message with the [employer] Web Console (learn more here)  Text page via [employer] Paging/Directory         Clinically Significant Risk Factors Present on Admission                  # Hypertension: home medication list includes antihypertensive(s)   # Acute Respiratory Failure: Documented O2 saturation < 91%.  Continue supplemental oxygen as needed     # Obesity: Estimated body mass index is 30.67 kg/m  as calculated from the following:    Height as of this encounter: 1.676 m (5' 6\").    Weight as of this encounter: 86.2 kg (190 lb).           ______________________________________________________________________    Interval History   Patient worried about her dog being alone at home.  Explained to her the reason why she needs to be in the hospital.  Patient seems to understand.  Also does have some blisters in the hand    Data reviewed today: I reviewed all medications, new labs and imaging results over the last 24 hours.    Physical Exam   Vital Signs: Temp: 97.8  F (36.6  C) Temp src: Oral BP: 115/68 Pulse: 61   Resp: 16 SpO2: 96 % O2 Device: None (Room air) Oxygen Delivery: 1 " LPM  Weight: 190 lbs 0 oz  Exam:  Constitutional: Awake, alert and no distress. Appears comfortable  Head: Normocephalic. No masses, lesions, tenderness or abnormalities  ENT: ENT exam normal, no neck nodes or sinus tenderness  Cardiovascular: RRR.  2+ murmurs, no rubs or JVD  Respiratory: Normal WOB,b/l equal air entry, no wheezes or crackles   Gastrointestinal: Abdomen soft, non-tender. BS normal. No masses, organomegaly  Skin: Few clear blisters on bilateral hand, not painful, no area of erythema  Extremities : No edema , no clubbing or cyanosis    Neurologic: Cranial nerves II-XII grossly intact , power symmetrical, Reflexes normal and symmetric. Sensation grossly WNL.      Data   Recent Labs   Lab 12/25/22  0034   WBC 18.3*   HGB 12.4   MCV 93         POTASSIUM 3.4   CHLORIDE 104   CO2 28   BUN 23   CR 0.85   ANIONGAP 6   CASTILLO 8.6   *     Recent Results (from the past 24 hour(s))   Chest XR,  PA & LAT    Narrative    EXAM: XR CHEST 2 VIEWS  LOCATION: [facility]  DATE/TIME: 12/25/2022 3:14 AM    INDICATION: hypoxia  COMPARISON: 07/23/2022      Impression    IMPRESSION: Stable exam with no acute findings. Similar appearance of thoracolumbar compression fractures.     Medications       sodium chloride 0.9%  1,000 mL Intravenous Once     [START ON 12/26/2022] cefTRIAXone  2 g Intravenous Q24H     sodium chloride (PF)  3 mL Intracatheter Q8H

## 2022-12-25 NOTE — ED PROVIDER NOTES
History   Chief Complaint:  Hypotension       The history is provided by the patient and the EMS personnel.      Ivania Rubi is a 82 year old female with history of hypertension, hyperlipidemia, and GERD who presents with hypotension.EMS reports that patient is coming from home after she had a fall incident outside and was in the snow for 15-20 minutes. She complained of her fingers being cold and numb then. EMS took her blood pressure and patient was hypotensive. Her sugar level is 141. Patient reports that she fell in her apartment parking lot after stubbing her toe on a chunk of ice. She fell forward onto her face and denies hitting her head. Patient was not able to get up and was there until her neighbors found her and helped her up. Neighbor's relative is a nurse and advised them to call EMS. She states that she has frequent fall episodes and is not abnormal for her to not be able to get up. Patient has a UTI and currently on antibiotics that makes her feel nauseous with vomiting episodes. Denies chest pain and shortness of breath. History of spinal implant stimulator, 2 knee replacements, and right shoulder reversed.     (12/24/22 - [facility] [place]) MDM:   Ivania Rubi is a 82 y.o. female with a history of UTI, with her last previous 2 urine cultures in our system growing pansensitive Klebsiella, who presents to the emergency department for evaluation of fatigue and vomiting that started yesterday and persisted this morning. She states she has developed the symptoms previously with UTIs. She did have a low-grade elevated temperature of 99.9 Fahrenheit on arrival. She has a benign nontender abdominal exam. He did appear mildly dehydrated. She had IV placed and blood drawn and sent. She was given Zofran for nausea and fluids. Her urinalysis does look consistent with infection. Urine culture was sent. She was covered with IV ceftriaxone. Her previous urine cultures have grown fairly pansensitive  Klebsiella. She does have a leukocytosis with a white count of 16.8 and a left shift. She had trace hyponatremia which should improve with the fluid she received. There is no evidence of acute kidney injury or other electrolyte imbalance. Glucose is mildly elevated but this may be stress demarginalization related to acute infection. Lactic acid is reassuringly normal. Given her concomitant back pain I did obtain a CT of her abdomen pelvis to ensure there is no evidence of obstructing ureteral stone as she did have a lithotripsy about a month ago. Her CT demonstrates multiple small nonobstructing stones in the left kidney but no ureteral stones or hydronephrosis. There is no other acute pathology demonstrated on the CT. I did feel the patient symptoms were related to UTI. Where he felt in order to be safely discharged she would need to be tolerating p.o. intake and feeling improved. She was oral challenged and did quite well. She has had no further vomiting. She notes she feels much better. She was monitored and reassessed. Her vital signs have been stable. At this time I do think she can be safely discharged home. She will be discharged with Zofran as needed for any continued nausea and 10 days of Omnicef twice daily. We did discuss return precautions in detail including but not limited to fever, persistent vomiting not improved with Zofran, inability to tolerate the oral antibiotics, or feeling she was getting worse/sicker. We be happy to reassess her if she is not doing well at home. I did also offer her an observation admission, however the patient was comfortable with discharged home and strongly prefers to go with home, particularly since it is a holiday. I have recommended primary care follow-up within 1 week. She will monitor symptoms and return as needed with new or worsening symptoms or additional concerns. She was discharged in good condition.      Review of Systems   Respiratory: Negative for shortness of  "breath.    Cardiovascular: Negative for chest pain.   10 point review of systems performed and is negative except as above and in HPI.    Allergies:  Oxybutinin Chloride [Oxybutynin]    Medications:  Fluticasone-salmetr    Past Medical History:     Hypovitaminosis D  Allergic rhinitis  Obesity  Spinal stenosis  Transient global amnesia  Transient hypoxia  Hydronephrosis with urinary obstruction due to ureteral calculus  Pyelonephritis  Adrenal adenoma  Chronic panic disorder  GERD  Hypertension  Hyperlipidemia    Past Surgical History:    Arthrodesis foot   Cervical spinal fusion  Carpal tunnel release  ENT surgery  Abdominal hysterectomy  Laser holmium lithotripsy ureter(s), insert stent combined  TKA right and left  Knee scopes  Bunionectomy right and left   Phacoemulsification clear cornea with standard intraocular lens implant, x2  Release trigger finger  Reverse arthroplasty shoulder  Vitrectomy parsplana with 25 gauge system  Lumbar spine fusion     Social History:  Presents alone.  Presents via EMS.  PCP: Cj Saha     Physical Exam     Patient Vitals for the past 24 hrs:   BP Temp Temp src Pulse Resp SpO2 Height Weight   12/25/22 0315 93/53 -- -- 62 -- 97 % -- --   12/25/22 0245 (!) 87/55 -- -- 63 -- 93 % -- --   12/25/22 0215 (!) 85/46 -- -- 67 -- 93 % -- --   12/25/22 0200 98/57 -- -- 62 -- 98 % -- --   12/25/22 0147 -- -- -- -- -- 97 % -- --   12/25/22 0145 98/58 -- -- 65 -- -- -- --   12/25/22 0144 -- -- -- -- -- 97 % -- --   12/25/22 0142 96/53 -- -- 65 -- 95 % -- --   12/25/22 0049 -- -- -- -- -- (!) 88 % -- --   12/25/22 0041 -- -- -- -- -- (!) 88 % -- --   12/25/22 0040 -- -- -- -- -- 92 % -- --   12/25/22 0039 92/52 -- -- 63 -- -- -- --   12/25/22 0030 (!) 83/53 -- -- 60 -- 97 % -- --   12/25/22 0028 -- -- -- -- -- -- 1.676 m (5' 6\") 86.2 kg (190 lb)   12/25/22 0023 109/50 97.3  F (36.3  C) Temporal 74 16 96 % -- --       Physical Exam  General: Resting on the gurney, appears " uncomfortable  Head:  The scalp, face, and head appear normal  Mouth/Throat: Mucus membranes are slightly dry  CV:  Regular rate    Normal S1 and S2  No pathological murmur   Resp:  Breath sounds clear and equal bilaterally    Non-labored, no retractions or accessory muscle use    No coarseness    No wheezing   GI:  Abdomen is soft, no rigidity    No tenderness to palpation  MS:  Normal motor assessment of all extremities.    Good capillary refill noted.    Skin:   No rash or lesions noted.  Neuro:  Speech is normal and fluent. No apparent deficit.  Psych:  Awake. Alert.  Normal affect.      Appropriate interactions.    Emergency Department Course     Imaging:  Chest XR,  PA & LAT   Final Result   IMPRESSION: Stable exam with no acute findings. Similar appearance of thoracolumbar compression fractures.        Report per radiology    Laboratory:  Labs Ordered and Resulted from Time of ED Arrival to Time of ED Departure   BASIC METABOLIC PANEL - Abnormal       Result Value    Sodium 138      Potassium 3.4      Chloride 104      Carbon Dioxide (CO2) 28      Anion Gap 6      Urea Nitrogen 23      Creatinine 0.85      Calcium 8.6      Glucose 139 (*)     GFR Estimate 69     TROPONIN I - Abnormal    Troponin I High Sensitivity 66 (*)    CBC WITH PLATELETS AND DIFFERENTIAL - Abnormal    WBC Count 18.3 (*)     RBC Count 3.98      Hemoglobin 12.4      Hematocrit 37.1      MCV 93      MCH 31.2      MCHC 33.4      RDW 14.4      Platelet Count 206      % Neutrophils 80      % Lymphocytes 11      % Monocytes 8      % Eosinophils 0      % Basophils 0      % Immature Granulocytes 1      NRBCs per 100 WBC 0      Absolute Neutrophils 14.6 (*)     Absolute Lymphocytes 2.0      Absolute Monocytes 1.5 (*)     Absolute Eosinophils 0.0      Absolute Basophils 0.1      Absolute Immature Granulocytes 0.2      Absolute NRBCs 0.0     ROUTINE UA WITH MICROSCOPIC REFLEX TO CULTURE - Abnormal    Color Urine Yellow      Appearance Urine Slightly  Cloudy (*)     Glucose Urine Negative      Bilirubin Urine Negative      Ketones Urine Negative      Specific Gravity Urine 1.021      Blood Urine Moderate (*)     pH Urine 5.5      Protein Albumin Urine 70 (*)     Urobilinogen Urine Normal      Nitrite Urine Negative      Leukocyte Esterase Urine Large (*)     WBC Clumps Urine Present (*)     Mucus Urine Present (*)     RBC Urine 13 (*)     WBC Urine >182 (*)     Squamous Epithelials Urine <1      Hyaline Casts Urine 15 (*)    ISTAT GASES LACTATE VENOUS POCT - Abnormal    Lactic Acid POCT 0.5      Bicarbonate Venous POCT 28      O2 Sat, Venous POCT 78 (*)     pCO2V Venous POCT 52 (*)     pH Venous POCT 7.33      pO2 Venous POCT 46     COVID-19 VIRUS (CORONAVIRUS) BY PCR   URINE CULTURE   BLOOD CULTURE   BLOOD CULTURE      Emergency Department Course:     Reviewed:  I reviewed nursing notes, vitals, past medical history and Care Everywhere    Assessments:  0019 I obtained history and examined the patient as noted above.   0256 I rechecked the patient and explained findings.     Consults:  0245 I consulted with Dr. Jacob, Hospitalist, who accepts admission.    Interventions:  Medications   cefTRIAXone (ROCEPHIN) 2 g vial to attach to  ml bag for ADULTS or NS 50 ml bag for PEDS (2 g Intravenous New Bag 12/25/22 0225)   0.9% sodium chloride BOLUS (0 mLs Intravenous Stopped 12/25/22 0144)     Disposition:  The patient was admitted to the hospital under the care of Dr. Jacob.     Impression & Plan   Medical Decision Making:  Ivania Rubi is a 82 year old female who presents for evaluation of generalized weakness following a fall in the setting of known UTI.  Upon arrival, there is a low mean arterial pressure/hypotension. This is consistent with sepsis.  Lactate normal but blood pressure is low.  White blood cell count elevated..     The etiology of the sepsis is most likely a urinary tract infection.  Based on prior cultures I am treating it as Klebsiella  which has historically been sensitive to ceftriaxone.  She did have a CT yesterday with no evidence of obstructing stone and as such imaging was not repeated today.  A broad workup was done but I feel this is most likely infectious and treatment as noted above was directed towards infectious etiologies.     Based on lactate and blood pressure measurements, I feel patient can be served best on a inpatient bed.  Repeat blood pressures show persistent MAP greater than 60.  Based on this I would not start levophed.  Given her falls and feeling unwell and generalized weakness I will admit her to the hospitalist service for sepsis.    Diagnosis:    ICD-10-CM    1. Sepsis, due to unspecified organism, unspecified whether acute organ dysfunction present (H)  A41.9           Scribe Disclosure:  VEL, Rogers García, am serving as a scribe at 12:21 AM on 12/25/2022 to document services personally performed by Fara Her MD based on my observations and the provider's statements to me.            Fara Her MD  12/25/22 0337

## 2022-12-25 NOTE — ED TRIAGE NOTES
Pt tripped on the ice and fell pt fell into a snow bank and was sitting in the snow bank for about 20 min. Pt reports she was dx with UTI earlier in the day. Pt BIBA. EMS report hypotension and gave 500ml bolus.      Triage Assessment     Row Name 12/25/22 0029       Triage Assessment (Adult)    Airway WDL WDL       Respiratory WDL    Respiratory WDL WDL       Skin Circulation/Temperature WDL    Skin Circulation/Temperature WDL WDL       Cardiac WDL    Cardiac WDL WDL       Peripheral/Neurovascular WDL    Peripheral Neurovascular WDL WDL       Cognitive/Neuro/Behavioral WDL    Cognitive/Neuro/Behavioral WDL WDL

## 2022-12-25 NOTE — PHARMACY-ADMISSION MEDICATION HISTORY
Pharmacy Medication History  Admission medication history interview status for the 12/25/2022  admission is complete. See EPIC admission navigator for prior to admission medications     Location of Interview: Patient room  Medication history sources: Patient, Surescripts and Care Everywhere    Significant changes made to the medication list:  Added: tylenol, zofran, cefdinir  Removed: calcium, norco, culturell    In the past week, patient estimated taking medication this percent of the time: greater than 90%    Additional medication history information:   Updated med list per patient's testimony.   Patient was prescribed zofran and cefdinir yesterday (12/24/22) and she picked up the medications but didn't take any yet, she was told to start today.     Medication reconciliation completed by provider prior to medication history? No    Time spent in this activity: 20 min    Prior to Admission medications    Medication Sig Last Dose Taking? Auth Provider Long Term End Date   acetaminophen (TYLENOL 8 HOUR) 650 MG CR tablet Take 1,300 mg by mouth 2 times daily as needed for mild pain 12/23/2022 at am Yes Unknown, Entered By History     cefdinir (OMNICEF) 300 MG capsule Take 300 mg by mouth 2 times daily  Yes Unknown, Entered By History     cetirizine (ZYRTEC) 10 MG tablet Take 10 mg by mouth daily 12/23/2022 at am Yes Reported, Patient     Cholecalciferol (VITAMIN D-3 PO) Take 1 tablet by mouth 2 times daily 12/23/2022 at am Yes Reported, Patient     famotidine (PEPCID) 40 MG tablet Take 40 mg by mouth At Bedtime 12/22/2022 Yes Reported, Patient     lisinopril-hydrochlorothiazide (PRINZIDE/ZESTORETIC) 20-12.5 MG per tablet Take 1 tablet by mouth daily 12/23/2022 at am Yes Reported, Patient No    methimazole (TAPAZOLE) 5 MG tablet Take 5 mg by mouth daily  12/23/2022 at am Yes Reported, Patient     Multiple Vitamins-Minerals (CENTRUM SILVER) per tablet Take 1 tablet by mouth daily 12/23/2022 at am Yes Reported, Patient      Omega-3 Fatty Acids (FISH OIL PO) Take 1 capsule by mouth 2 times daily 12/23/2022 at am Yes Reported, Patient     omeprazole (PRILOSEC) 40 MG DR capsule Take 40 mg by mouth 2 times daily 12/23/2022 at am Yes Reported, Patient     ondansetron (ZOFRAN ODT) 4 MG ODT tab Take 4-8 mg by mouth every 8 hours as needed for nausea or vomiting  Yes Unknown, Entered By History     atorvastatin (LIPITOR) 40 MG tablet Take 40 mg by mouth daily  12/22/2022 at pm  Reported, Patient Yes    citalopram (CELEXA) 20 MG tablet Take 20 mg by mouth every morning  12/23/2022 at am  Reported, Patient         The information provided in this note is only as accurate as the sources available at the time of update(s)

## 2022-12-25 NOTE — PROGRESS NOTES
RECEIVING UNIT ED HANDOFF REVIEW    ED Nurse Handoff Report was reviewed by: Marilia Rowland RN on December 25, 2022 at 4:27 AM

## 2022-12-25 NOTE — ED NOTES
[facility]  ED Nurse Handoff Report    ED Chief complaint: Hypotension      ED Diagnosis:   Final diagnoses:   None       Code Status: Full Code    Allergies:   Allergies   Allergen Reactions    Oxybutinin Chloride [Oxybutynin] Hives       Patient Story: Pt tripped on the ice and fell pt fell into a snow bank and was sitting in the snow bank for about 20 min. Pt reports she was dx with UTI earlier in the day. Pt BIBA. EMS report hypotension and gave 500ml bolus  Focused Assessment:  fall, hypotension, UTI. Pt has bruise to buttock from fall. Pt reports several falls over the last few months. Pt reports difficulty getting up after a fall. Pt SAO2 drops to 88% while resting. Pt placed on O2 at 1 liter per NC    Treatments and/or interventions provided: see MAR  Patient's response to treatments and/or interventions:     To be done/followed up on inpatient unit:      Does this patient have any cognitive concerns?:  alert and oriented    Activity level - Baseline/Home:  Independent  Activity Level - Current:   Stand with Assist    Patient's Preferred language: English   Needed?: No    Isolation: None  Infection: Not Applicable  Patient tested for COVID 19 prior to admission: NO  Bariatric?: No    Vital Signs:   Vitals:    12/25/22 0142 12/25/22 0144 12/25/22 0145 12/25/22 0147   BP: 96/53  98/58    Pulse: 65  65    Resp:       Temp:       TempSrc:       SpO2: 95% 97%  97%   Weight:       Height:           Cardiac Rhythm:     Was the PSS-3 completed:   Yes  What interventions are required if any?               Family Comments:   OBS brochure/video discussed/provided to patient/family: Yes              Name of person given brochure if not patient:               Relationship to patient:     For the majority of the shift this patient's behavior was Green.   Behavioral interventions performed were .    ED NURSE PHONE NUMBER: [phone]

## 2022-12-26 ENCOUNTER — APPOINTMENT (OUTPATIENT)
Dept: PHYSICAL THERAPY | Facility: CLINIC | Age: 82
DRG: 871 | End: 2022-12-26
Attending: INTERNAL MEDICINE
Payer: MEDICARE

## 2022-12-26 LAB
ALBUMIN SERPL-MCNC: 2.1 G/DL (ref 3.4–5)
ALP SERPL-CCNC: 72 U/L (ref 40–150)
ALT SERPL W P-5'-P-CCNC: 14 U/L (ref 0–50)
ANION GAP SERPL CALCULATED.3IONS-SCNC: 5 MMOL/L (ref 3–14)
AST SERPL W P-5'-P-CCNC: 15 U/L (ref 0–45)
BACTERIA UR CULT: NO GROWTH
BASOPHILS # BLD AUTO: 0.1 10E3/UL (ref 0–0.2)
BASOPHILS NFR BLD AUTO: 1 %
BILIRUB SERPL-MCNC: 0.9 MG/DL (ref 0.2–1.3)
BUN SERPL-MCNC: 14 MG/DL (ref 7–30)
CALCIUM SERPL-MCNC: 8.6 MG/DL (ref 8.5–10.1)
CHLORIDE BLD-SCNC: 107 MMOL/L (ref 94–109)
CO2 SERPL-SCNC: 25 MMOL/L (ref 20–32)
CREAT SERPL-MCNC: 0.55 MG/DL (ref 0.52–1.04)
EOSINOPHIL # BLD AUTO: 0.2 10E3/UL (ref 0–0.7)
EOSINOPHIL NFR BLD AUTO: 2 %
ERYTHROCYTE [DISTWIDTH] IN BLOOD BY AUTOMATED COUNT: 14.5 % (ref 10–15)
GFR SERPL CREATININE-BSD FRML MDRD: >90 ML/MIN/1.73M2
GLUCOSE BLD-MCNC: 98 MG/DL (ref 70–99)
HCT VFR BLD AUTO: 36.2 % (ref 35–47)
HGB BLD-MCNC: 11.7 G/DL (ref 11.7–15.7)
IMM GRANULOCYTES # BLD: 0.1 10E3/UL
IMM GRANULOCYTES NFR BLD: 1 %
LYMPHOCYTES # BLD AUTO: 1.8 10E3/UL (ref 0.8–5.3)
LYMPHOCYTES NFR BLD AUTO: 18 %
MAGNESIUM SERPL-MCNC: 2.1 MG/DL (ref 1.6–2.3)
MCH RBC QN AUTO: 30.8 PG (ref 26.5–33)
MCHC RBC AUTO-ENTMCNC: 32.3 G/DL (ref 31.5–36.5)
MCV RBC AUTO: 95 FL (ref 78–100)
MONOCYTES # BLD AUTO: 1.2 10E3/UL (ref 0–1.3)
MONOCYTES NFR BLD AUTO: 11 %
NEUTROPHILS # BLD AUTO: 7.1 10E3/UL (ref 1.6–8.3)
NEUTROPHILS NFR BLD AUTO: 67 %
NRBC # BLD AUTO: 0 10E3/UL
NRBC BLD AUTO-RTO: 0 /100
PLATELET # BLD AUTO: 187 10E3/UL (ref 150–450)
POTASSIUM BLD-SCNC: 3.8 MMOL/L (ref 3.4–5.3)
POTASSIUM BLD-SCNC: 3.8 MMOL/L (ref 3.4–5.3)
PROT SERPL-MCNC: 5.2 G/DL (ref 6.8–8.8)
RBC # BLD AUTO: 3.8 10E6/UL (ref 3.8–5.2)
SODIUM SERPL-SCNC: 137 MMOL/L (ref 133–144)
WBC # BLD AUTO: 10.4 10E3/UL (ref 4–11)

## 2022-12-26 PROCEDURE — 99232 SBSQ HOSP IP/OBS MODERATE 35: CPT | Performed by: INTERNAL MEDICINE

## 2022-12-26 PROCEDURE — 84132 ASSAY OF SERUM POTASSIUM: CPT | Performed by: INTERNAL MEDICINE

## 2022-12-26 PROCEDURE — 250N000013 HC RX MED GY IP 250 OP 250 PS 637: Performed by: INTERNAL MEDICINE

## 2022-12-26 PROCEDURE — 120N000001 HC R&B MED SURG/OB

## 2022-12-26 PROCEDURE — 83735 ASSAY OF MAGNESIUM: CPT | Performed by: INTERNAL MEDICINE

## 2022-12-26 PROCEDURE — 250N000011 HC RX IP 250 OP 636: Performed by: INTERNAL MEDICINE

## 2022-12-26 PROCEDURE — 36415 COLL VENOUS BLD VENIPUNCTURE: CPT | Performed by: INTERNAL MEDICINE

## 2022-12-26 PROCEDURE — 97161 PT EVAL LOW COMPLEX 20 MIN: CPT | Mod: GP

## 2022-12-26 PROCEDURE — 97116 GAIT TRAINING THERAPY: CPT | Mod: GP

## 2022-12-26 PROCEDURE — 97530 THERAPEUTIC ACTIVITIES: CPT | Mod: GP

## 2022-12-26 PROCEDURE — 85025 COMPLETE CBC W/AUTO DIFF WBC: CPT | Performed by: INTERNAL MEDICINE

## 2022-12-26 RX ORDER — LISINOPRIL AND HYDROCHLOROTHIAZIDE 12.5; 2 MG/1; MG/1
1 TABLET ORAL DAILY
Status: DISCONTINUED | OUTPATIENT
Start: 2022-12-26 | End: 2022-12-27 | Stop reason: HOSPADM

## 2022-12-26 RX ORDER — CETIRIZINE HYDROCHLORIDE 10 MG/1
10 TABLET ORAL DAILY
Status: DISCONTINUED | OUTPATIENT
Start: 2022-12-26 | End: 2022-12-27 | Stop reason: HOSPADM

## 2022-12-26 RX ADMIN — PANTOPRAZOLE SODIUM 40 MG: 40 TABLET, DELAYED RELEASE ORAL at 20:50

## 2022-12-26 RX ADMIN — CITALOPRAM HYDROBROMIDE 20 MG: 20 TABLET ORAL at 09:05

## 2022-12-26 RX ADMIN — ATORVASTATIN CALCIUM 40 MG: 40 TABLET, FILM COATED ORAL at 20:50

## 2022-12-26 RX ADMIN — CEFTRIAXONE SODIUM 2 G: 2 INJECTION, POWDER, FOR SOLUTION INTRAMUSCULAR; INTRAVENOUS at 02:47

## 2022-12-26 RX ADMIN — METHIMAZOLE 5 MG: 5 TABLET ORAL at 09:05

## 2022-12-26 RX ADMIN — PANTOPRAZOLE SODIUM 40 MG: 40 TABLET, DELAYED RELEASE ORAL at 09:05

## 2022-12-26 RX ADMIN — LISINOPRIL AND HYDROCHLOROTHIAZIDE 1 TABLET: 12.5; 2 TABLET ORAL at 17:07

## 2022-12-26 RX ADMIN — CETIRIZINE HYDROCHLORIDE 10 MG: 10 TABLET, FILM COATED ORAL at 09:05

## 2022-12-26 ASSESSMENT — ACTIVITIES OF DAILY LIVING (ADL)
ADLS_ACUITY_SCORE: 29

## 2022-12-26 NOTE — PROGRESS NOTES
[facility]    Medicine Progress Note - Hospitalist Service    Date of Admission:  12/25/2022    Assessment & Plan   Ivania Rbui is a 82 year old female admitted on 12/25/2022. She presents to the emergency department in the setting of a known urinary tract infection recently evaluated at outside emergency department after a fall, and weakness limiting her ability to get up.  Brought to the emergency department and found to have sepsis associated with her urinary tract infection.     Severe sepsis with early septic shock: In the setting of urinary tract infection  Septic encephalopathy    Patient with fever of 101 at home, rigors since Friday, septic encephalopathy, leukocytosis to 18,000+.  Multiple blood pressures reading arrival in the mid 80s systolic range despite fluid administration in the emergency department.  -CT abdomen pelvis performed at [place] emergency department 12/24 multiple small nonobstructing stone within the lower pole of left kidney likely due to lithotripsy stent, no evidence of hydronephrosis   -Head CT negative for any acute intracranial abnormality  -UA grossly abnormal, urine culture from [facility] system negative, however patient had received ceftriaxone at [facility] on 12/24, however urine culture from [facility] on 12/24 growing Klebsiella pneumoniae sensitive to cefazolin, ceftriaxone, gentamicin, Levaquin and Bactrim but resistant to ampicillin and intermediate sensitivity to nitrofurantoin, blood culture negative to date  -Continue 2 g ceftriaxone every 24 hours started during admission to make sure blood culture continues to remain negative  -Currently prior to admission antihypertensives including lisinopril hydrochlorothiazide is on hold due to septic shock, if blood pressure continues to improve, gradually resumption with holding parameters     Hypokalemia  -Replace per protocol    Hypertension  -Resume PTA  lisinopril/hydrochlorothiazide with holding parameters if blood pressure continues to remain stable    Frequent falls: Patient reports that she has approximately 6 falls per year.  Several weeks ago with a fall onto her buttocks with bruising and pain with sitting.    Presented with a fall where she caught her toe on a chunk of ice and fell, was in the ice for several minutes and does have some frostbite evidence on her hand.  Lives independently in a condo.  -Physical therapy consult pending  -Fall precautions     Non-ST elevation myocardial infarction, type II: Troponin elevated at 66.  Suspect secondary to demand ischemia in the setting of sepsis with hypotension.  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  -Resume prior to admission methimazole   -TSH normal     Hyperlipidemia:  -Resume prior to admission Lipitor      GERD:  -Resume prior to admission Prilosec      Chronic back pain:  -Has a spinal stimulator in place  -As needed acetaminophen, low-dose oxycodone available as needed  -X-ray lumbar spine negative for any fracture/acute abnormality     Diet: Combination Diet Regular Diet Adult    DVT Prophylaxis: Pneumatic Compression Devices  Dunn Catheter: Not present  Central Lines: None  Cardiac Monitoring: None  Code Status: Full Code      Disposition Plan      Expected Discharge Date: 12/28/2022                The patient's care was discussed with the Bedside Nurse and Patient.    Sunita Leal MD  Hospitalist Service  [facility]  Securely message with the Vocera Web Console (learn more here)  Text page via E[employer] Paging/Directory         Clinically Significant Risk Factors        # Hypokalemia: Lowest K = 3.2 mmol/L in last 2 days, will replace as needed    # Hypercalcemia: corrected calcium is >10.1, will monitor as appropriate    # Hypoalbuminemia: Lowest albumin  "= 2.1 g/dL at 12/26/2022  8:59 AM, will monitor as appropriate            # Obesity: Estimated body mass index is 30.67 kg/m  as calculated from the following:    Height as of this encounter: 1.676 m (5' 6\").    Weight as of this encounter: 86.2 kg (190 lb)., PRESENT ON ADMISSION         ______________________________________________________________________    Interval History   Patient very worried about her puffy face due to fluid that she received during resuscitation.  Denies any chest pain, shortness of breath, cough, dizziness or palpitation.    Data reviewed today: I reviewed all medications, new labs and imaging results over the last 24 hours.    Physical Exam   Vital Signs: Temp: 97.2  F (36.2  C) Temp src: Oral BP: (!) 140/71 Pulse: 69   Resp: 16 SpO2: 93 % O2 Device: None (Room air)    Weight: 190 lbs 0 oz  Exam:  Constitutional: Awake, alert and no distress. Appears comfortable  Head: Normocephalic. No masses, lesions, tenderness or abnormalities  ENT: ENT exam normal, no neck nodes or sinus tenderness  Cardiovascular: RRR.  2+ murmurs, no rubs or JVD  Respiratory: Normal WOB,b/l equal air entry, no wheezes or crackles   Gastrointestinal: Abdomen soft, non-tender. BS normal. No masses, organomegaly  Skin: Few clear blisters on bilateral hand, not painful, no area of erythema  Extremities : Minimal bilateral lower extremity pitting edema , puffiness around the eyes, no clubbing or cyanosis    Neurologic: Cranial nerves II-XII grossly intact , power symmetrical, Reflexes normal and symmetric. Sensation grossly WNL.      Data   Recent Labs   Lab 12/26/22  0859 12/26/22  0028 12/25/22  1838 12/25/22  0751 12/25/22  0034   WBC 10.4  --   --  16.1* 18.3*   HGB 11.7  --   --  11.5* 12.4   MCV 95  --   --  94 93     --   --  190 206     --   --  138 138   POTASSIUM 3.8 3.8 3.4 3.2* 3.4   CHLORIDE 107  --   --  105 104   CO2 25  --   --  28 28   BUN 14  --   --  22 23   CR 0.55  --   --  0.69 0.85 "   ANIONGAP 5  --   --  5 6   CASTILLO 8.6  --   --  8.5 8.6   GLC 98  --   --  100* 139*   ALBUMIN 2.1*  --   --  2.4*  --    PROTTOTAL 5.2*  --   --  5.6*  --    BILITOTAL 0.9  --   --  0.7  --    ALKPHOS 72  --   --  74  --    ALT 14  --   --  17  --    AST 15  --   --  19  --      No results found for this or any previous visit (from the past 24 hour(s)).  Medications       atorvastatin  40 mg Oral Daily     cefTRIAXone  2 g Intravenous Q24H     cetirizine  10 mg Oral Daily     citalopram  20 mg Oral QAM     methimazole  5 mg Oral Daily     pantoprazole  40 mg Oral BID     sodium chloride (PF)  3 mL Intracatheter Q8H

## 2022-12-26 NOTE — PLAN OF CARE
Goal Outcome Evaluation:         Shift Note: 1900 - 0730    A&Ox4, mildly forgetful. VSS on RA. Denied pain overnight. Up SBA with walker to BR. Continent of B&B, voiding spontaneously. Tolerating regular diet, good appetite. PIV SL with int rocephin. K+ and mag protocols in place. Blisters to fingers bilaterally, tender with touch. Blood and urine cultures pending.

## 2022-12-26 NOTE — PLAN OF CARE
[id number] shift update:    A&Ox4. VSS on RA. Denies chest pain and SOB. Blisters to fingers-tender to touch. Denies pain. Up SBA with walker. Tolerating reg diet. Awaiting urine and blood cultures.

## 2022-12-26 NOTE — PLAN OF CARE
Goal Outcome Evaluation:       Patient remains stable with vitals in the normal range. Alert and oriented. Hard of hearing. Uses hearing aide. Some edema on lower extremities. MD aware. Echocardiogram ordered. PT recommendation to home with home care. Up in the [address] with one assist a walker and gait belt. Plans to discharge in the morning.

## 2022-12-26 NOTE — PROGRESS NOTES
12/26/22 1200   Appointment Info   Signing Clinician's Name / Credentials (PT) Nita Ivey, PT, DPT   Living Environment   People in Home alone   Current Living Arrangements condominium   Home Accessibility no concerns   Transportation Anticipated car, drives self;family or friend will provide   Living Environment Comments Patient lives alone in a condo.  There is an elevator to access her unit.  Patient plans to discharge to friends' split entry home.  Patient's daughter and friend are looking into alternative housing to increased caregiver support.   Self-Care   Usual Activity Tolerance good   Current Activity Tolerance good   Regular Exercise No   Equipment Currently Used at Home none   Fall history within last six months yes   Number of times patient has fallen within last six months 6   Activity/Exercise/Self-Care Comment Patient reports baseline independence with ADLs.  Has struggled with managing cooking tasks recently.  She states she was furniture/wall walking in her apartment.  Has had 3 falls in the past month.  Patient states she could use her late mother's 4WW.   General Information   Onset of Illness/Injury or Date of Surgery 12/25/22   Referring Physician Jacob, Mike Yan MD   Patient/Family Therapy Goals Statement (PT) Patient would like information on alternative housing.   Pertinent History of Current Problem (include personal factors and/or comorbidities that impact the POC) 82 year old female admitted on 12/25/2022. She presents to the emergency department in the setting of a known urinary tract infection recently evaluated at outside emergency department after a fall, and weakness limiting her ability to get up.  Brought to the emergency department and found to have sepsis associated with her urinary tract infection.   Existing Precautions/Restrictions fall   Cognition   Affect/Mental Status (Cognition) WFL   Orientation Status (Cognition) oriented x 4   Follows Commands (Cognition)  WFL;delayed response/completion;increased processing time needed   Pain Assessment   Patient Currently in Pain No   Integumentary/Edema   Integumentary/Edema Comments Age-related skin changes, blisters on right hand, bruise on right shin   Posture    Posture Forward head position   Range of Motion (ROM)   Range of Motion ROM is WFL   Strength (Manual Muscle Testing)   Strength (Manual Muscle Testing) Able to perform R SLR;Able to perform L SLR;Deficits observed during functional mobility   Bed Mobility   Comment, (Bed Mobility) Patient mod I for supine <> sit transfer with HOB elevated.   Transfers   Comment, (Transfers) Patient SBA for sit <> stand from bed.   Gait/Stairs (Locomotion)   Distance in Feet (Gait) 10' eval, 200' CGA no AD + 200' FWW SBA   Comment, (Gait/Stairs) Patient ambulates 10' with CGA and no assistive device, demonstrates step-through gait pattern with slowed gait speed and increased lateral sway.  At times reaching for objects in environment.   Balance   Balance Comments Impaired dynamic balance, >5 falls in the past 6 months   Sensory Examination   Sensory Perception patient reports no sensory changes   Clinical Impression   Criteria for Skilled Therapeutic Intervention Yes, treatment indicated   PT Diagnosis (PT) Impaired functional mobility   Influenced by the following impairments Right hand blisters/pain, generalized weakness and deconditioning, impaired balance, decreased activity tolerance   Functional limitations due to impairments Impaired independence with bed mobility, transfers, gait, and stair negotiation   Clinical Presentation (PT Evaluation Complexity) Stable/Uncomplicated   Clinical Presentation Rationale Clinical judgement, PMH, social support   Clinical Decision Making (Complexity) low complexity   Planned Therapy Interventions (PT) balance training;bed mobility training;gait training;home exercise program;neuromuscular re-education;patient/family education;postural  re-education;stair training;strengthening;transfer training;progressive activity/exercise   Anticipated Equipment Needs at Discharge (PT) walker, rolling   Risk & Benefits of therapy have been explained evaluation/treatment results reviewed;care plan/treatment goals reviewed;risks/benefits reviewed;participants voiced agreement with care plan;participants included;patient   PT Total Evaluation Time   PT Eval, Low Complexity Minutes (91702) 10   Physical Therapy Goals   PT Frequency Daily   PT Predicted Duration/Target Date for Goal Attainment 12/29/22   PT Goals Bed Mobility;Transfers;Gait;Stairs   PT: Bed Mobility Independent;Supine to/from sit;Rolling   PT: Transfers Modified independent;Sit to/from stand;Bed to/from chair;Assistive device   PT: Gait Supervision/stand-by assist;150 feet;Rolling walker;Goal Met   PT: Stairs Supervision/stand-by assist;6 stairs;Rail on right   Interventions   Interventions Quick Adds Gait Training;Therapeutic Activity   Therapeutic Activity   Therapeutic Activities: dynamic activities to improve functional performance Minutes (36558) 10   Symptoms Noted During/After Treatment None   Treatment Detail/Skilled Intervention Patient greeted supine in bed, awake on arrival.  Patient educated on role of PT in acute care setting, agreeble to PT session.  Patient admitted after fall in snow, large blisters noted on digits of right hand.  Patient able to perform R and L SLR.  Completes supine > sit transfer with mod I, HOB elevated.  She maintains static sitting balance with SBA.  Patient performs x2 sit <> stands from bed with SBA.  Following 200' ambulation in fajardo, requires seated rest and cues for pursed lip breathing to facilitate aerobic recovery.  Patient returning to bed at end of session, mod I for sit > supine transfer.  Patient left with call light in reach and bed alarm activated, tray set-up with fanny.   Gait Training   Gait Training Minutes (64892) 16   Symptoms Noted  During/After Treatment (Gait Training) fatigue;shortness of breath   Treatment Detail/Skilled Intervention Patient completes two bouts of ambulation in the fajardo, each ~200'.  First walk with CGA and no AD per home.  Patient with no overt loss of balance but demonstrates step-through gait pattern with slowed speed and increased lateral instability.  Intermittently reaching for objects in environment for support.  Patient expressing increased fear of falling.  Following seated rest, patient ambulates additional 200' with FWW and SBA.  With AD, patient demonstrates increased gait speed and stability.  Patient states she could use her late mother's 4WW at discharge.   PT Discharge Planning   PT Plan Trial gait with 4WW (bring from 5th floor, educate on adjusting walker height), stairs   PT Discharge Recommendation (DC Rec) home with assist;home with outpatient physical therapy   PT Rationale for DC Rec Patient presents below her IND baseline, currently SBA for transfers and gait with rolling walker.  Patient lives alone but plans to discharge to friends' home to increased caregiver support.  Patient will need to negotiate stairs to access friends' home.  Patient plans to use her late mother's 4WW at discharge to minimize falls risk.  May benefit from OP PT to address remaining strength, balance, and endurance deficits at discharge, has participated in OP PT in past.   PT Brief overview of current status Mod I bed mob, SBA STS, SBA gait with walker, CGA gait w/o AD   Total Session Time   Timed Code Treatment Minutes 26   Total Session Time (sum of timed and untimed services) 36

## 2022-12-27 ENCOUNTER — APPOINTMENT (OUTPATIENT)
Dept: PHYSICAL THERAPY | Facility: CLINIC | Age: 82
DRG: 871 | End: 2022-12-27
Payer: MEDICARE

## 2022-12-27 ENCOUNTER — APPOINTMENT (OUTPATIENT)
Dept: CARDIOLOGY | Facility: CLINIC | Age: 82
DRG: 871 | End: 2022-12-27
Attending: INTERNAL MEDICINE
Payer: MEDICARE

## 2022-12-27 VITALS
HEART RATE: 64 BPM | BODY MASS INDEX: 31.85 KG/M2 | HEIGHT: 66 IN | DIASTOLIC BLOOD PRESSURE: 61 MMHG | TEMPERATURE: 98.3 F | WEIGHT: 198.2 LBS | OXYGEN SATURATION: 93 % | RESPIRATION RATE: 16 BRPM | SYSTOLIC BLOOD PRESSURE: 142 MMHG

## 2022-12-27 LAB
LVEF ECHO: NORMAL
MAGNESIUM SERPL-MCNC: 2.1 MG/DL (ref 1.6–2.3)
POTASSIUM BLD-SCNC: 3.5 MMOL/L (ref 3.4–5.3)

## 2022-12-27 PROCEDURE — 999N000208 ECHOCARDIOGRAM COMPLETE

## 2022-12-27 PROCEDURE — 93306 TTE W/DOPPLER COMPLETE: CPT | Mod: 26 | Performed by: INTERNAL MEDICINE

## 2022-12-27 PROCEDURE — 97530 THERAPEUTIC ACTIVITIES: CPT | Mod: GP

## 2022-12-27 PROCEDURE — 255N000002 HC RX 255 OP 636: Performed by: INTERNAL MEDICINE

## 2022-12-27 PROCEDURE — 84132 ASSAY OF SERUM POTASSIUM: CPT | Performed by: INTERNAL MEDICINE

## 2022-12-27 PROCEDURE — 36415 COLL VENOUS BLD VENIPUNCTURE: CPT | Performed by: INTERNAL MEDICINE

## 2022-12-27 PROCEDURE — 250N000013 HC RX MED GY IP 250 OP 250 PS 637: Performed by: INTERNAL MEDICINE

## 2022-12-27 PROCEDURE — 99239 HOSP IP/OBS DSCHRG MGMT >30: CPT | Performed by: INTERNAL MEDICINE

## 2022-12-27 PROCEDURE — 83735 ASSAY OF MAGNESIUM: CPT | Performed by: INTERNAL MEDICINE

## 2022-12-27 PROCEDURE — 999N000128 HC STATISTIC PERIPHERAL IV START W/O US GUIDANCE

## 2022-12-27 PROCEDURE — 97116 GAIT TRAINING THERAPY: CPT | Mod: GP

## 2022-12-27 RX ORDER — CEFDINIR 300 MG/1
300 CAPSULE ORAL EVERY 12 HOURS SCHEDULED
Status: DISCONTINUED | OUTPATIENT
Start: 2022-12-27 | End: 2022-12-27 | Stop reason: HOSPADM

## 2022-12-27 RX ADMIN — CETIRIZINE HYDROCHLORIDE 10 MG: 10 TABLET, FILM COATED ORAL at 09:05

## 2022-12-27 RX ADMIN — HUMAN ALBUMIN MICROSPHERES AND PERFLUTREN 9 ML: 10; .22 INJECTION, SOLUTION INTRAVENOUS at 12:31

## 2022-12-27 RX ADMIN — PANTOPRAZOLE SODIUM 40 MG: 40 TABLET, DELAYED RELEASE ORAL at 09:05

## 2022-12-27 RX ADMIN — LISINOPRIL AND HYDROCHLOROTHIAZIDE 1 TABLET: 12.5; 2 TABLET ORAL at 09:05

## 2022-12-27 RX ADMIN — METHIMAZOLE 5 MG: 5 TABLET ORAL at 09:05

## 2022-12-27 RX ADMIN — CITALOPRAM HYDROBROMIDE 20 MG: 20 TABLET ORAL at 09:05

## 2022-12-27 RX ADMIN — CEFDINIR 300 MG: 300 CAPSULE ORAL at 06:00

## 2022-12-27 ASSESSMENT — ACTIVITIES OF DAILY LIVING (ADL)
ADLS_ACUITY_SCORE: 25
ADLS_ACUITY_SCORE: 29
ADLS_ACUITY_SCORE: 25

## 2022-12-27 NOTE — PLAN OF CARE
[id number]    A/Ox4. VSS on RA. Denies pain. SBA w/ W, steady. Regular diet. Continent of B/B. IV access lost, MD aware. K+ and Mag protocols in place. Blisters to fingers bilaterally, numb feeling. Discharge tomorrow, pt requesting walker w/seat. Pt wants friend to listen to discharge instructions as she is going to live with her after discharge.

## 2022-12-27 NOTE — CONSULTS
Care Management Initial Consult    General Information  Assessment completed with: Patient, Friend, Ivania & friend Whitley  Type of CM/SW Visit: Initial Assessment    Primary Care Provider verified and updated as needed: Yes   Readmission within the last 30 days: no previous admission in last 30 days      Reason for Consult: discharge planning  Advance Care Planning:            Communication Assessment  Patient's communication style: spoken language (English or Bilingual)    Hearing Difficulty or Deaf: no   Wear Glasses or Blind: yes    Cognitive  Cognitive/Neuro/Behavioral: WDL                      Living Environment:   People in home: alone     Current living Arrangements: apartment      Able to return to prior arrangements:         Family/Social Support:  Care provided by: self  Provides care for: pet(s) (dog)  Marital Status: Single  Children, Other (specify) (friend)          Description of Support System: Supportive, Involved         Current Resources:   Patient receiving home care services: No     Community Resources: None  Equipment currently used at home: none  Supplies currently used at home: None    Employment/Financial:  Employment Status: retired        Financial Concerns:             Lifestyle & Psychosocial Needs:  Social Determinants of Health     Tobacco Use: Medium Risk     Smoking Tobacco Use: Never     Smokeless Tobacco Use: Never     Passive Exposure: Current   Alcohol Use: Not on file   Financial Resource Strain: Not on file   Food Insecurity: Not on file   Transportation Needs: Not on file   Physical Activity: Not on file   Stress: Not on file   Social Connections: Not on file   Intimate Partner Violence: Not on file   Depression: Not on file   Housing Stability: Not on file       Functional Status:  Prior to admission patient needed assistance:              Mental Health Status:          Chemical Dependency Status:                Values/Beliefs:  Spiritual, Cultural Beliefs, [employer]  Practices, Values that affect care:                 Additional Information:  Per consult for discharge planning, met with pt to discuss discharge plan.  Per pt, prior to admit, pt was living alone and was independent with all ADLs,does own cooking,cleaning and was still driving.  Discussed that PT is recommending OP PT and pt was in agreement with this plan.  Per pt, she will be staying with her friend that has a 1 level home for a few days and will have assistance if needed.  Pt's friend shared that they are looking into getting pt on Meals on Wheels and pursuing Independent Living apt within AL setting. Informed that Senior Linkage number and website will be placed on discharge instructions for assistance with resources for housing.  Care Management Discharge Note    Discharge Date: 12/27/2022       Discharge Disposition: Home, Outpatient Rehab (PT, OT, SLP, Cardiac or Pulmonary)    Discharge Services: None    Discharge DME: None    Discharge Transportation: car, drives self, family or friend will provide    Private pay costs discussed: Not applicable    PAS Confirmation Code:    Patient/family educated on Medicare website which has current facility and service quality ratings: no    Education Provided on the Discharge Plan:    Persons Notified of Discharge Plans: bedside RN  Patient/Family in Agreement with the Plan: yes    Handoff Referral Completed: No    Additional Information:  Patient will discharge to friend's home with OP PT.  Patient requested PCP appt be scheduled at [facility] location.  Called and scheduled pt to see Dr. Aden on Jan. 2nd at 10:00 AM for an after hospital stay follow-up appointment.  Pt's friend available to transport pt home and to OP PT appts.      JAUN Lopez RN, BSN, OCN   Inpatient Care Coordination [address]  Office: [phone]

## 2022-12-27 NOTE — PLAN OF CARE
Goal Outcome Evaluation:       A&Ox4, mildly forgetful. VSS on RA. Denied pain. Up SBA with walker to BR, steady. Continent of B&B, voiding spontaneously. Tolerating regular diet, good appetite. PIV SL. K+ and mag protocols in place. Blisters to fingers bilaterally, tender with touch. Pt to be discharged tomorrow. Pt would like to be sent home with a walker w/ seat. Would also like a home health RN to help with blister management on hand. Sticky note for SCOUT.

## 2022-12-27 NOTE — DISCHARGE INSTRUCTIONS
Please call the Senior LinkAge Line at [phone] for resources and assistance with housing.  [email or web address]

## 2022-12-27 NOTE — PLAN OF CARE
Physical Therapy Discharge Summary    Reason for therapy discharge:    Discharged to home with outpatient therapy.    Progress towards therapy goal(s). See goals on [facility] Plan in [facility] health record for goal details.  Goals partially met.  Barriers to achieving goals:   discharge from facility.    Therapy recommendation(s):    Continued therapy is recommended.  Rationale/Recommendations:  Patient presents below her IND baseline, currently SBA for transfers and gait with rolling walker.  Patient lives alone but plans to discharge to friends' home to increased caregiver support. Pt demonstrated safe ability to navigate stairs with SBA and handrail support. Patient plans to use her late mother's 4WW at discharge to minimize falls risk, but won't be able to access the 4WW for a couple weeks. Will plan to discharge from hospital with FWW. May benefit from OP PT to address remaining strength, balance, and endurance deficits at discharge, has participated in OP PT in past.

## 2022-12-27 NOTE — PROVIDER NOTIFICATION
Brief update:    Lost IV access, declined placement of additional IV    Due for IV Rocephin dose    [place] urine culture from 12/24 growing Klebsiella sensitive to cefazolin, ceftriaxone, gentamicin, Levaquin, Bactrim.    Restarting Omnicef 300 mg twice daily.  Patient already with a prescription for Omnicef from her prior ER visit, so we will continue with same medication.    Okay to give a.m. dose early or wait until morning if patient clinically stable, as I would not prescribe a twice daily medication to be administered at 2:30 in the morning and 2:30 in the afternoon.    Mike Jacob MD  2:33 AM

## 2022-12-27 NOTE — PROGRESS NOTES
MD Notification    Notified Person: MD    Notified Person Name: Dr. Jacob    Notification Date/Time: 0030    Notification Interaction: [employer] paging    Purpose of Notification: Pt lost IV access, Rocefin due at 0230. Pt wondering if could be switched to PO abx since discharging tomorrow to avoid getting another IV. Please advise.     Orders Received: No response    Comments:

## 2022-12-27 NOTE — PROGRESS NOTES
MD Notification    Notified Person: MD    Notified Person Name: Dr. Jacob    Notification Date/Time: 2:29 AM  12/27/22      Notification Interaction: Am[employer] paging    Purpose of Notification: Pt refused IV, requesting PO abx.    Orders Received:    Comments:

## 2022-12-27 NOTE — DISCHARGE SUMMARY
[facility]  Hospitalist Discharge Summary      Date of Admission:  12/25/2022  Date of Discharge:  12/27/2022  Discharging Provider: Sunita Leal MD  Discharge Service: Hospitalist Service    Discharge Diagnoses   Urinary tract infection  Severe sepsis with septic shock related to UTI, resolved prior to discharge  Septic encephalopathy, resolved prior to discharge  Hypokalemia, replaced  History of hypertension  Type II non-ST elevation MI  Hypothyroidism  Hyperlipidemia  Gastroesophageal reflux disease  Chronic back pain  Fall with frostbite in bilateral fingers, improving    Follow-ups Needed After Discharge   Follow-up Appointments     Follow-up and recommended labs and tests       Follow up with primary care provider, Cj Saha, within 7 days for   hospital follow- up.           Unresulted Labs Ordered in the Past 30 Days of this Admission     Date and Time Order Name Status Description    12/25/2022  2:33 AM Blood Culture Peripheral Blood Preliminary     12/25/2022  2:33 AM Blood Culture Peripheral Blood Preliminary         Discharge Disposition   Discharged to home  Condition at discharge: Stable    Hospital Course   Ivania Rubi is a 82 year old female admitted on 12/25/2022. She presents to the emergency department in the setting of a known urinary tract infection recently evaluated at outside emergency department after a fall, and weakness limiting her ability to get up.  Brought to the emergency department and found to have sepsis associated with her urinary tract infection.     Severe sepsis with early septic shock: In the setting of urinary tract infection  Septic encephalopathy    Patient with fever of 101 at home, rigors since Friday, septic encephalopathy, leukocytosis to 18,000+.  Multiple blood pressures reading arrival in the mid 80s systolic range despite fluid administration in the emergency department.  -CT abdomen pelvis performed at [facility]  department 12/24 multiple small nonobstructing stone within the lower pole of left kidney likely due to lithotripsy stent, no evidence of hydronephrosis   -Head CT negative for any acute intracranial abnormality  -UA grossly abnormal, urine culture from [place] system negative, however patient had received ceftriaxone at [facility] on 12/24, however urine culture from [facility] on 12/24 growing Klebsiella pneumoniae sensitive to cefazolin, ceftriaxone, gentamicin, Levaquin and Bactrim but resistant to ampicillin and intermediate sensitivity to nitrofurantoin, blood culture negative to date  -Patient transition to oral cefdinir, patient reports she has prescriptions at home from recent ER visit for 10 days which patient was advised to continue taking.     Hypokalemia  -Replaced per protocol    Hypertension  -Continue PTA lisinopril/hydrochlorothiazide     Frequent falls  Frostbite:  Patient reports that she has approximately 6 falls per year.  Several weeks ago with a fall onto her buttocks with bruising and pain with sitting.    Presented with a fall where she caught her toe on a chunk of ice and fell, was in the ice for several minutes and does have some frostbite evidence on her hand with few blisters which are improving.  Lives independently in a condo.  -Physical therapy recommended home with outpatient physical therapy      Non-ST elevation myocardial infarction, type II: Troponin elevated at 66.  Suspect secondary to demand ischemia in the setting of sepsis with hypotension.  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  -Continue prior to admission methimazole   -TSH normal     Hyperlipidemia:  -Continue prior to admission Lipitor      GERD:  -Continue prior to admission Prilosec      Chronic back pain:  -Has a spinal stimulator in place  -As needed  acetaminophen, low-dose oxycodone available as needed  -X-ray lumbar spine negative for any fracture/acute abnormality    Consultations This Hospital Stay   PHYSICAL THERAPY ADULT IP CONSULT  CARE MANAGEMENT / SOCIAL WORK IP CONSULT  VASCULAR ACCESS ADULT IP CONSULT    Code Status   Full Code    Time Spent on this Encounter   ISunita MD, personally saw the patient today and spent greater than 30 minutes discharging this patient.       Sunita Leal MD  [place] [id number] [facility]  [address] [place], [place] [address]NI[place]I, [address]  [facility] [place] [address]  Phone: [phone]  ______________________________________________________________________    Physical Exam   Vital Signs: Temp: 98.3  F (36.8  C) Temp src: Oral BP: (!) 143/94 Pulse: 64   Resp: 16 SpO2: 93 % O2 Device: None (Room air)    Weight: 198 lbs 3.2 oz  Exam:  Constitutional: Awake, alert and no distress. Appears comfortable  Head: Normocephalic. No masses, lesions, tenderness or abnormalities  ENT: ENT exam normal, no neck nodes or sinus tenderness  Cardiovascular: RRR.  2+ murmurs, no rubs or JVD  Respiratory: Normal WOB,b/l equal air entry, no wheezes or crackles   Gastrointestinal: Abdomen soft, non-tender. BS normal. No masses, organomegaly  Skin: Few clear blisters on bilateral hand, not painful, no area of erythema  Extremities : Minimal bilateral lower extremity pitting edema , puffiness around the eyes, no clubbing or cyanosis    Neurologic: Cranial nerves II-XII grossly intact , power symmetrical, Reflexes normal and symmetric. Sensation grossly WNL.       Primary Care Physician   Cj Saha    Discharge Orders      Physical Therapy Referral      Physical Therapy Referral      Reason for your hospital stay    Generalized weakness secondary to urinary tract infection with sepsis     Follow-up and recommended labs and tests     Follow up with primary care provider, Cj Saha, within 7 days for hospital follow- up.     Activity    Your activity  upon discharge: activity as tolerated     Walker Order for DME - ONLY FOR DME    I, the undersigned, certify that the above prescribed supplies are medically necessary for this patient and is both reasonable and necessary in reference to accepted standards of medical and necessary in reference to accepted standards of medical practice in the treatment of this patient's condition and is not prescribed as a convenience.      Diet    Follow this diet upon discharge: Orders Placed This Encounter      Combination Diet Regular Diet Adult       Significant Results and Procedures   Results for orders placed or performed during the hospital encounter of 12/25/22   Chest XR,  PA & LAT    Narrative    EXAM: XR CHEST 2 VIEWS  LOCATION: [facility]  DATE/TIME: 12/25/2022 3:14 AM    INDICATION: hypoxia  COMPARISON: 07/23/2022      Impression    IMPRESSION: Stable exam with no acute findings. Similar appearance of thoracolumbar compression fractures.   CT Head w/o Contrast    Narrative    EXAM: CT HEAD W/O CONTRAST  LOCATION: [facility]  DATE/TIME: 12/25/2022 8:33 AM    INDICATION: fall; non focal mental slowing (symptoms of subdural hematoma). Injury. Pain.  COMPARISON: None.  TECHNIQUE: Routine CT Head without IV contrast. Multiplanar reformats. Dose reduction techniques were used.    FINDINGS:  INTRACRANIAL CONTENTS: No intracranial hemorrhage, extraaxial collection, or mass effect.  No CT evidence of acute infarct. Mild presumed chronic small vessel ischemic changes. Mild generalized volume loss. No hydrocephalus. Skull base vascular   calcifications.    VISUALIZED ORBITS/SINUSES/MASTOIDS: Prior bilateral cataract surgery. Visualized portions of the orbits are otherwise unremarkable. No paranasal sinus mucosal disease. No middle ear or mastoid effusion.    BONES/SOFT TISSUES: No acute abnormality.      Impression    IMPRESSION:  1.  No acute intracranial process.   XR Lumbar  Spine 2/3 Views    Narrative    EXAM: XR LUMBAR SPINE 2/3 VIEWS  LOCATION: [facility]  DATE/TIME: 12/25/2022 8:31 AM    INDICATION: fall, bruising, low back pain w  sitting  COMPARISON: Abdomen pelvis CT with sagittal and coronal spine reformats 23 July 2022  TECHNIQUE: CR Lumbar Spine.      Impression    IMPRESSION: Upper lumbar dextroconvex curvature of 10 degrees centered at L2. Normal vertebral body heights. Negative for fracture. L4-S1 fusion instrumentation is present with bilateral transpedicular screws. No hardware complications. Interbody graft   at L5-S1. Lumbosacral posterior lateral bone fusion mass. Transitional anatomy noted. No bone fractures/subsidence. Negative evidence for pseudoarthrosis.    Moderate to severe degenerative disc changes at L1-2 through L3-4. 4 mm degenerative retrolisthesis at L2-3 and L3-4.    Spine stimulator leads enters the canal at T12-L1.    Calcified gallstones in the right upper quadrant.    Hip arthropathy left side more so than right.       Discharge Medications   Current Discharge Medication List      CONTINUE these medications which have NOT CHANGED    Details   acetaminophen (TYLENOL 8 HOUR) 650 MG CR tablet Take 1,300 mg by mouth 2 times daily as needed for mild pain      cefdinir (OMNICEF) 300 MG capsule Take 300 mg by mouth 2 times daily      cetirizine (ZYRTEC) 10 MG tablet Take 10 mg by mouth daily      Cholecalciferol (VITAMIN D-3 PO) Take 1 tablet by mouth 2 times daily      famotidine (PEPCID) 40 MG tablet Take 40 mg by mouth At Bedtime      lisinopril-hydrochlorothiazide (PRINZIDE/ZESTORETIC) 20-12.5 MG per tablet Take 1 tablet by mouth daily      methimazole (TAPAZOLE) 5 MG tablet Take 5 mg by mouth daily       Multiple Vitamins-Minerals (CENTRUM SILVER) per tablet Take 1 tablet by mouth daily      Omega-3 Fatty Acids (FISH OIL PO) Take 1 capsule by mouth 2 times daily      omeprazole (PRILOSEC) 40 MG DR capsule Take 40 mg by mouth 2  times daily      ondansetron (ZOFRAN ODT) 4 MG ODT tab Take 4-8 mg by mouth every 8 hours as needed for nausea or vomiting      atorvastatin (LIPITOR) 40 MG tablet Take 40 mg by mouth daily       citalopram (CELEXA) 20 MG tablet Take 20 mg by mouth every morning            Allergies   Allergies   Allergen Reactions     Oxybutinin Chloride [Oxybutynin] Hives

## 2022-12-27 NOTE — PLAN OF CARE
Goal Outcome Evaluation:       Patient remains stable with vitals notable for baseline high blood pressure (143/94). Scheduled lisinopril given. Recheck was 142/61. Alert and oriented. Denies pain. Numbness on right hand from snow fall incident. Echocardiogram complete. Discharging this afternoon with PTA antibiotics.

## 2022-12-29 ENCOUNTER — PATIENT OUTREACH (OUTPATIENT)
Dept: CARE COORDINATION | Facility: CLINIC | Age: 82
End: 2022-12-29

## 2022-12-29 NOTE — PROGRESS NOTES
Clinic Care Coordination Contact  [facility]/Voicemail       Clinical Data: Care Coordinator Outreach  Outreach attempted x 2.  Left message on patient's voicemail with call back information and requested return call.  Plan: Care Coordinator will do no further outreaches at this time.    YULIET Ahn  [phone]  [facility]

## 2022-12-30 LAB
BACTERIA BLD CULT: NO GROWTH
BACTERIA BLD CULT: NO GROWTH

## 2023-01-09 ENCOUNTER — HOSPITAL ENCOUNTER (OUTPATIENT)
Dept: PHYSICAL THERAPY | Facility: CLINIC | Age: 83
Discharge: HOME | End: 2023-01-09
Attending: INTERNAL MEDICINE
Payer: MEDICARE

## 2023-01-09 DIAGNOSIS — R26.89 IMPAIRMENT OF BALANCE: ICD-10-CM

## 2023-01-09 DIAGNOSIS — R29.6 FALLS FREQUENTLY: ICD-10-CM

## 2023-01-09 DIAGNOSIS — A41.9 SEPSIS, DUE TO UNSPECIFIED ORGANISM, UNSPECIFIED WHETHER ACUTE ORGAN DYSFUNCTION PRESENT (H): Primary | ICD-10-CM

## 2023-01-09 PROCEDURE — 97110 THERAPEUTIC EXERCISES: CPT | Mod: GP | Performed by: PHYSICAL THERAPIST

## 2023-01-09 PROCEDURE — 97162 PT EVAL MOD COMPLEX 30 MIN: CPT | Mod: GP | Performed by: PHYSICAL THERAPIST

## 2023-01-09 NOTE — PROGRESS NOTES
01/09/23 1100   Quick Adds   Quick Adds Certification   Type of Visit Initial OP PT Evaluation   General Information   Start of Care Date 01/09/23   Referring Physician Sunita Leal MD   Orders Evaluate and Treat as Indicated   Order Date 12/27/22   Medical Diagnosis Sepsis, due to unspecified organism, unspecified whether acute organ dysfunction present   Onset of illness/injury or Date of Surgery 12/25/22   Surgical/Medical history reviewed Yes   Pertinent history of current problem (include personal factors and/or comorbidities that impact the POC) Ivania Rubi is an 82 year old female admitted on 12/25/2022-12/27/2022. She presented to the emergency department in the setting of a known urinary tract infection recently evaluated at outside emergency department after a fall, and weakness limiting her ability to get up.  Found to have sepsis associated with her urinary tract infection. Presented post fall where she caught her toe on a chunk of ice and fell, was in the ice for several minutes and does have some frostbite evidence on her hand with few blisters which are improving.  Has kidney stones removed in Nov 2022.  Patient reports that she has approximately 6 falls per year.  Several weeks ago she had a fall onto her buttocks with bruising and pain with sitting. She has also tripped on rugs or fell down a hill while controlling a cart. Uses a night light in bathroom. Lives independently in a condo and uses the elevator.  She spends a lot of time at a friends house who has stairs.  Pt owns a SEC, FWW and 4WW however did not bring either into session.  She notes feeling fearful of falling when outside and has a walker in her car. 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   Patient/Family Goals Statement learn how to get up from the ground   Fall Risk Screen   Fall screen completed by  PT   Have you fallen 2 or more times in the past year? Yes   Have you fallen and had an injury in the past year? Yes   Timed Up and Go score (seconds) 13.5   Is patient a fall risk? Yes   Abuse Screen (yes response referral indicated)   Feels Unsafe at Home or Work/School no   Feels Threatened by Someone no   Does Anyone Try to Keep You From Having Contact with Others or Doing Things Outside Your Home? no   Physical Signs of Abuse Present no   Pain   Patient currently in pain No   Cognitive Status Examination   Orientation orientation to person, place and time   Level of Consciousness alert   Posture   Posture Forward head position   Range of Motion (ROM)   ROM Quick Adds no deficits were identified   Strength   Strength Comments B hip strength: 3+/5, knee and ankle 4/5 throughout   Transfer Skills   Transfer Comments Independent but must use UE A   Gait   Gait Comments slower gait speed, reduced step length, decreased heel strike.   Gait Special Tests   Gait Special Tests 25 FOOT TIMED WALK;FUNCTIONAL GAIT ASSESSMENT   Gait Special Tests 25 Foot Timed Walk   Comments 0.74m/s - 10MWT.  Age/gender norms: >0.8 m/s   Gait Special Tests Functional Gait Assessment Score out of 30   Score out of 30 12   Comments <22/30 indicates fall risk   Balance Special Tests   Balance Special Tests Single leg stance right;Single leg stance left;Romberg;Sit to stand reps;Morgan balance   Balance Special Tests Morgan Balance   Score out of 56 39   Comments <45/56 indicates high fall risk   Balance Special Tests Single Leg Stance Right,   Right, seconds 0 Seconds   Comments attempts however unable   Balance Special Tests Single Leg Stance Left   Left, seconds 0 Seconds   Comments attempts however unable   Balance Special Tests Romberg   Seconds 30 Seconds   Balance Special Tests Sit to Stand Reps in 30 Seconds   Reps in 30 seconds 0   Height 17   Comments age/gender norms: 12 reps   Sensory Examination   Sensory Perception no deficits were  "identified   Coordination   Coordination no deficits were identified   Coordination Comments intact to rapid alt toe taps   Planned Therapy Interventions   Planned Therapy Interventions balance training;gait training;neuromuscular re-education;strengthening;stretching;transfer training   Clinical Impression   Criteria for Skilled Therapeutic Interventions Met yes, treatment indicated   PT Diagnosis unstable balance; frequent falls   Influenced by the following impairments unstable balance, weakness, decreased activiity tolerance   Functional limitations due to impairments difficulty with transfers when unable to use arms to A, community ambulation, stair climbing and navigation.   Clinical Presentation Evolving/Changing   Clinical Presentation Rationale PMH, elaine, FGA, gait speed, 30\" chair stand, activity tolerance   Clinical Decision Making (Complexity) Moderate complexity   Therapy Frequency 1 time/week   Predicted Duration of Therapy Intervention (days/wks) up to 90 days   Risk & Benefits of therapy have been explained Yes   Patient, Family & other staff in agreement with plan of care Yes   Clinical Impression Comments Pt is a 81 yo female who presents with complaints of frequent falls and difficulty getting up from the ground without assistance. PT evaluation reveals high fall risk on the FGA and ELAINE as well as gait speed. She also demonstrates proximal mm weakness as shown on the 30\" chair stand and MMT. Pt's falls appear to be multifactorial and she would benefit from skilled PT to address deficits to optimize function and reduce fall risk   Education Assessment   Preferred Learning Style Listening;Demonstration;Pictures/video   Barriers to Learning No barriers   GOALS   PT Eval Goals 1;2;3;4;5;6   Goal 1   Goal Identifier ELAINE   Goal Description Pt will score >46/56 on the ELAINE in order to reduce fall risk   Goal Progress baseline\" 39/56   Target Date 04/09/23   Goal 2   Goal Identifier Gait speed   Goal " "Description Pt will improve comfortable gait speed to >/=0.82 m/s with LRD in order to ease community mobility and safety   Goal Progress baseline: 0.74 m/s   Target Date 04/09/23   Goal 3   Goal Identifier 30\" chair stand   Goal Description Pt will be able to complete 2 stands in 30\" without use of arms to A in order to demonstrate increased functional strength to maintain independence in all settings   Goal Progress baseline: 0   Target Date 04/09/23   Goal 4   Goal Identifier floor transfers   Goal Description Pt will be able to complete a floor transfer with SBA in order to aide in fall recovery   Target Date 04/09/23   Goal 5   Goal Identifier HEP   Goal Description Pt will be independent with a HEP to self manage symptoms   Target Date 04/09/23   Goal 6   Goal Identifier 6 MWT   Goal Description Pt will improve 6 MWT by 100' in order to improve tolerance and ease for community mobility and participation   Target Date 04/09/23   Total Evaluation Time   PT Eval, Moderate Complexity Minutes (10040) 40   Therapy Certification   Certification date from 01/09/23   Certification date to 04/09/23   Medical Diagnosis Sepsis, due to unspecified organism, unspecified whether acute organ dysfunction present     "

## 2023-01-09 NOTE — PROGRESS NOTES
01/09/23 1100   Signing Clinician's Name / Credentials   Signing clinician's name / credentials Richard Monson, PT, DPT, NCS   Functional Gait Assessment (POLO Mcnulty, MARCIE Saxena, et al. (2004))   1. GAIT LEVEL SURFACE 1  (8.06 seconds)   2. CHANGE IN GAIT SPEED 2   3. GAIT WITH HORIZONTAL HEAD TURNS 1   4. GAIT WITH VERTICAL HEAD TURNS 1   5. GAIT AND PIVOT TURN 2   6. STEP OVER OBSTACLE 1   7. GAIT WITH NARROW BASE OF SUPPORT 0   8. GAIT WITH EYES CLOSED 1  (13.97 seconds)   9. AMBULATING BACKWARDS 1  (29 seconds, very slow)   10. STEPS 2   Total Functional Gait Assessment Score   TOTAL SCORE: (MAXIMUM SCORE 30) 12       Functional Gait Assessment (FGA): The FGA assesses postural stability during various walking tasks.   Gait assistive device used: None    Scores of <22 /30 have been correlated with predicting falls in community-dwelling older adults according to Pricila & Darrell 2010.   Scores of <18 /30 have been correlated with increased risk for falls in patients with Parkinsons Disease according to En Rothman Zhou et al 2014.  Minimal Detectable Change for patients with acute/chronic stroke = 4.2 according to León & Ritschel 2009  Minimal Detectable Change for patients with vestibular disorder = 8 according to Pricila & Darrell 2010

## 2023-01-09 NOTE — PROGRESS NOTES
"                                                                           [facility]                                                                                   OUTPATIENT PHYSICAL THERAPY FUNCTIONAL EVALUATION  PLAN OF TREATMENT FOR OUTPATIENT REHABILITATION  (COMPLETE FOR INITIAL CLAIMS ONLY)  Patient's Last Name, First Name, M.I.  YOB: 1940  Ivania Rubi     Provider's Name   [facility]   Medical Record No.  [id number]     Start of Care Date:  01/09/23   Onset Date:  12/25/22   Type:     _X__PT   ____OT  ____SLP Medical Diagnosis:  Sepsis, due to unspecified organism, unspecified whether acute organ dysfunction present     PT Diagnosis:  unstable balance; frequent falls Visits from SOC:  1                              __________________________________________________________________________________  Plan of Treatment/Functional Goals:  balance training, gait training, neuromuscular re-education, strengthening, stretching, transfer training           GOALS  ELAINE  Pt will score >46/56 on the ELAINE in order to reduce fall risk  04/09/23    Gait speed  Pt will improve comfortable gait speed to >/=0.82 m/s with LRD in order to ease community mobility and safety  04/09/23    30\" chair stand  Pt will be able to complete 2 stands in 30\" without use of arms to A in order to demonstrate increased functional strength to maintain independence in all settings  04/09/23    floor transfers  Pt will be able to complete a floor transfer with SBA in order to aide in fall recovery  04/09/23    HEP  Pt will be independent with a HEP to self manage symptoms  04/09/23    6 MWT  Pt will improve 6 MWT by 100' in order to improve tolerance and ease for community mobility and participation  04/09/23           Therapy Frequency:  1 time/week   Predicted Duration of Therapy Intervention:  up to 90 days    Richard Monson, PT                        "             I CERTIFY THE NEED FOR THESE SERVICES FURNISHED UNDER        THIS PLAN OF TREATMENT AND WHILE UNDER MY CARE     (Physician co-signature of this document indicates review and certification of the therapy plan).                Certification Date From:  01/09/23   Certification Date To:  04/09/23    Referring Provider:  Sunita Leal MD    Initial Assessment  See Epic Evaluation- Start of Care Date: 01/09/23

## 2023-01-09 NOTE — PROGRESS NOTES
01/09/23 1100   Signing Clinician's Name / Credentials   Signing clinician's name / credentials Richard Monson, PT, DPT, NCS   Morgan Balance Scale (ARGENTINA MARTINEZ, ALYSHA S, JOHN GONZALES, NADJA FRANCOIS: MEASURING BALANCE IN THE ELDERLY: VALIDATION OF AN INSTRUMENT. CAN. J. PUB. HEALTH, JULY/AUGUST SUPPLEMENT 2:S7-11, 1992.)   Sit To Stand 2   Standing Unsupported 4   Sitting Unsupported 4   Stand to Sit 3   Transfers 3   Standing with Eyes Closed 4   Standing Unsupported, Feet Together 3   Reach Forward With Outstretched Arm 3   Retrieve Object From Floor 4   Turning to Look Behind 2   Turn 360 Degrees 2   Placing Alternate Foot on Stool (4-6 inches) 2   Unsupported Tandem Stand (Demonstrate to Subject) 2   One Leg Stand 1   Total Score (A score of 45 or less has been correlated with an increased risk of falls)   Total Score (out of 56) 39     Morgan Balance Scale (BBS) Cutoff Scores: A score of < 45 /56 indicates an increased risk for falls.     The BBS is a measure of static and dynamic standing balance that has been validated in community dwelling elderly individuals and individuals who have Parkinson's Disease, MS, and those who are s/p CVA and TBI. The test is administered without an assistive device. Scores from the Morgan are used to determine the probability of falling based on the patient's previous history of falls and their test performance.     Minimal Detectable Change = 6.5   & Minimal Detectable Change (Parkinson's Disease) = 5 according to Sumit & Paige 2008

## 2023-01-24 ENCOUNTER — HOSPITAL ENCOUNTER (OUTPATIENT)
Dept: PHYSICAL THERAPY | Facility: CLINIC | Age: 83
Discharge: HOME | End: 2023-01-24
Payer: COMMERCIAL

## 2023-01-24 DIAGNOSIS — R26.89 IMPAIRMENT OF BALANCE: ICD-10-CM

## 2023-01-24 DIAGNOSIS — A41.9 SEPSIS, DUE TO UNSPECIFIED ORGANISM, UNSPECIFIED WHETHER ACUTE ORGAN DYSFUNCTION PRESENT (H): Primary | ICD-10-CM

## 2023-01-24 PROCEDURE — 97750 PHYSICAL PERFORMANCE TEST: CPT | Mod: GP

## 2023-01-24 PROCEDURE — 97110 THERAPEUTIC EXERCISES: CPT | Mod: GP

## 2023-01-31 ENCOUNTER — HOSPITAL ENCOUNTER (OUTPATIENT)
Dept: PHYSICAL THERAPY | Facility: CLINIC | Age: 83
Discharge: HOME | End: 2023-01-31
Payer: MEDICARE

## 2023-01-31 DIAGNOSIS — A41.9 SEPSIS, DUE TO UNSPECIFIED ORGANISM, UNSPECIFIED WHETHER ACUTE ORGAN DYSFUNCTION PRESENT (H): Primary | ICD-10-CM

## 2023-01-31 DIAGNOSIS — R26.89 IMPAIRMENT OF BALANCE: ICD-10-CM

## 2023-01-31 PROCEDURE — 97110 THERAPEUTIC EXERCISES: CPT | Mod: GP | Performed by: PHYSICAL THERAPIST

## 2023-01-31 PROCEDURE — 97112 NEUROMUSCULAR REEDUCATION: CPT | Mod: GP | Performed by: PHYSICAL THERAPIST

## 2023-09-12 NOTE — ANESTHESIA PREPROCEDURE EVALUATION
September 12, 2023    To Whom It May Concern:         This is confirmation that Vika Mckeon attended her scheduled appointment with Sudeep Collado M.D. on 9/12/23. She was seen for a dry cough that has been ongoing for past 2 - 2 1/2 weeks; this is likely a recovery phase from a prior viral infection and at this point should be allowed to return to work.          If you have any questions please do not hesitate to call me at the phone number listed below.    Sincerely,          Sudeep Collado M.D.  [phone]                   Anesthesia Pre-Procedure Evaluation    Patient: Ivania Rubi   MRN: [id number] : 1940        Procedure : Procedure(s):  CYSTOSCOPY LEFT URETEROSCOPY WITH HOLMIUM LASER LITHOTRIPSY STONE REMOVAL AND STENT PLACEMENT          Past Medical History:   Diagnosis Date     Allergic rhinitis      Anemia      Arthritis     osteo arthritis     Depressive disorder, not elsewhere classified      Diarrhea      Displacement of cervical intervertebral disc without myelopathy      Gastroesophageal reflux disease      Hypotension, unspecified      Hypovitaminosis D      Nontoxic multinodular goiter      Obesity      PONV (postoperative nausea and vomiting)      Pure hypercholesterolemia      Rosacea      Spinal stenosis, lumbar region, without neurogenic claudication      Symptomatic menopausal or female climacteric states      Uncomplicated asthma      Unspecified asthma(493.90)      Unspecified essential hypertension       Past Surgical History:   Procedure Laterality Date     ARTHRODESIS FOOT       BACK SURGERY      cervical spinal fusion     BIOPSY      Thyroid     CARPAL TUNNEL RELEASE RT/LT       ENT SURGERY      T and A     GYN SURGERY      abdominal hysterectomy     ORTHOPEDIC SURGERY      tka right and left     ORTHOPEDIC SURGERY      knee scopes     ORTHOPEDIC SURGERY      bunionectomy rt and left     PHACOEMULSIFICATION CLEAR CORNEA WITH STANDARD INTRAOCULAR LENS IMPLANT  2014    Procedure: PHACOEMULSIFICATION CLEAR CORNEA WITH STANDARD INTRAOCULAR LENS IMPLANT;  RIGHT PHACOEMULSIFICATION CLEAR CORNEA WITH STANDARD INTRAOCULAR LENS IMPLANT ;  Surgeon: Marciano Zavala MD;  Location: [facility]     PHACOEMULSIFICATION CLEAR CORNEA WITH STANDARD INTRAOCULAR LENS IMPLANT  2014    Procedure: PHACOEMULSIFICATION CLEAR CORNEA WITH STANDARD INTRAOCULAR LENS IMPLANT;  LEFT PHACOEMULSIFICATION CLEAR CORNEA WITH STANDARD INTRAOCULAR LENS IMPLANT ;  Surgeon: Marciano Zavala MD;  Location:  [facility]     RELEASE  TRIGGER FINGER       REVERSE ARTHROPLASTY SHOULDER Right 4/3/2017    Procedure: REVERSE ARTHROPLASTY SHOULDER;  Surgeon: Kevin Mc MD;  Location:  OR     VITRECTOMY PARSPLANA WITH 25 GAUGE SYSTEM Left 10/25/2017    Procedure: VITRECTOMY PARSPLANA WITH 25 GAUGE SYSTEM;  LEFT EYE VITRECTOMY PARSPLANA WITH 25 GAUGE SYSTEM ; MEMBRANE PEEL ; AIR FLUID EXCHANGE;  Surgeon: Kevin Melton MD;  Location:  [facility]     ZZC LUMBAR SPINE FUSION,ANTER APPRCH        Allergies   Allergen Reactions     Oxybutinin Chloride [Oxybutynin] Hives      Social History     Tobacco Use     Smoking status: Never     Smokeless tobacco: Never   Substance Use Topics     Alcohol use: Yes     Comment: rare; wine twice a year      Wt Readings from Last 1 Encounters:   11/22/22 89.2 kg (196 lb 11.2 oz)        Anesthesia Evaluation   Pt has had prior anesthetic.     History of anesthetic complications  - PONV.      ROS/MED HX  ENT/Pulmonary:     (+) allergic rhinitis, asthma     Neurologic:       Cardiovascular:     (+) Dyslipidemia hypertension-----    METS/Exercise Tolerance:     Hematologic:       Musculoskeletal: Comment: Spinal stenosis, lumbar region, without neurogenic claudication;   Has spinal cord stimulator;      GI/Hepatic:     (+) GERD, Asymptomatic on medication,     Renal/Genitourinary:       Endo:     (+) Obesity,     Psychiatric/Substance Use:     (+) psychiatric history depression     Infectious Disease:       Malignancy:       Other:      (+) , H/O Chronic Pain,        Physical Exam    Airway        Mallampati: III   TM distance: > 3 FB   Neck ROM: full   Mouth opening: > 3 cm    Respiratory Devices and Support         Dental  no notable dental history         Cardiovascular   cardiovascular exam normal          Pulmonary   pulmonary exam normal                OUTSIDE LABS:  CBC:   Lab Results   Component Value Date    HGB 12.2 04/04/2017    HGB 15.4 04/03/2017     BMP:   Lab Results   Component Value Date    NA  143 04/03/2017    POTASSIUM 3.2 (L) 04/03/2017    POTASSIUM 3.9 01/13/2009    CHLORIDE 106 04/03/2017    CO2 31 04/03/2017    BUN 23 04/03/2017    CR 0.72 04/03/2017     (H) 04/04/2017    GLC 90 04/03/2017     COAGS: No results found for: PTT, INR, FIBR  POC: No results found for: BGM, HCG, HCGS  HEPATIC: No results found for: ALBUMIN, PROTTOTAL, ALT, AST, GGT, ALKPHOS, BILITOTAL, BILIDIRECT, RAMONE  OTHER:   Lab Results   Component Value Date    CASTILLO 9.5 04/03/2017       Anesthesia Plan    ASA Status:  3   NPO Status:  NPO Appropriate    Anesthesia Type: General.     - Airway: LMA   Induction: Intravenous.   Maintenance: TIVA.        Consents    Anesthesia Plan(s) and associated risks, benefits, and realistic alternatives discussed. Questions answered and patient/representative(s) expressed understanding.    - Discussed:     - Discussed with:  Patient         Postoperative Care    Pain management: IV analgesics.   PONV prophylaxis: Ondansetron (or other 5HT-3), Dexamethasone or Solumedrol     Comments:                MINDI LUTZ MD

## 2024-02-26 ENCOUNTER — MEDICAL CORRESPONDENCE (OUTPATIENT)
Dept: SCHEDULING | Facility: CLINIC | Age: 84
End: 2024-02-26
Payer: MEDICARE

## 2024-05-10 ENCOUNTER — ANCILLARY PROCEDURE (OUTPATIENT)
Dept: ULTRASOUND IMAGING | Facility: CLINIC | Age: 84
End: 2024-05-10
Attending: INTERNAL MEDICINE
Payer: MEDICARE

## 2024-05-10 DIAGNOSIS — E04.2 MULTINODULAR GOITER: ICD-10-CM

## 2024-05-10 PROCEDURE — 76536 US EXAM OF HEAD AND NECK: CPT

## (undated) DEVICE — EYE FORCEP TIP ADV ILM DISP 25GA 725.44

## (undated) DEVICE — MANIFOLD NEPTUNE 4 PORT 700-20

## (undated) DEVICE — DECANTER BAG 2002S

## (undated) DEVICE — SPONGE LAP 18X18" X8435

## (undated) DEVICE — DRAPE MICROSCOPE DRAPE  EYE DI40001

## (undated) DEVICE — SU ETHIBOND 1 CT-1 30" X425H

## (undated) DEVICE — Device

## (undated) DEVICE — PREP SKIN SCRUB TRAY 4461A

## (undated) DEVICE — DRSG KERLIX FLUFFS X5

## (undated) DEVICE — ENDO SEAL BX PORT BPS-A

## (undated) DEVICE — LINEN TOWEL PACK X5 5464

## (undated) DEVICE — GLOVE PROTEXIS W/NEU-THERA 8.5  2D73TE85

## (undated) DEVICE — DRAPE SHEET REV FOLD 3/4 9349

## (undated) DEVICE — TAPE MICROPORE 1"X1.5YD 1530S-1

## (undated) DEVICE — BLADE KNIFE SURG 10 371110

## (undated) DEVICE — EYE NDL RETROBULBAR 25GA 1.5" 581275

## (undated) DEVICE — GLOVE PROTEXIS POWDER FREE 8.5 ORTHOPEDIC 2D73ET85

## (undated) DEVICE — BLADE SAW SAGITTAL STRK 25X79.5X1.24MM 4/2000 2108-318-000

## (undated) DEVICE — EYE CANN SOFT TIP 25GA FOR VALVED SET 8065149530

## (undated) DEVICE — SYR 05ML SLIP TIP W/O NDL

## (undated) DEVICE — EYE SHIELD PLASTIC

## (undated) DEVICE — SPONGE RAY-TEC 4X8" 7318

## (undated) DEVICE — EYE PACK 25GA PLUS CONSTELLATION PPK4253

## (undated) DEVICE — PACK SET-UP STD 9102

## (undated) DEVICE — DRSG XEROFORM 5X9" 8884431605

## (undated) DEVICE — ESU ELEC BLADE 4" COATED

## (undated) DEVICE — SOL NACL 0.9% IRRIG 1000ML BOTTLE 07138-09

## (undated) DEVICE — CATH URETERAL OPEN END 6FR AXXCESS

## (undated) DEVICE — GUIDEWIRE AMPLATZ SUPER STIFF .035 X 145CM M0066401080

## (undated) DEVICE — GUIDEWIRE SENSOR DUAL FLEX STR 0.035"X150CM M0066703080

## (undated) DEVICE — EYE LENS VITRECTOMY MAGNIFYING ODVM

## (undated) DEVICE — SUCTION IRR SYSTEM W/O TIP INTERPULSE HANDPIECE 0210-100-000

## (undated) DEVICE — NDL 22GA 1.5"

## (undated) DEVICE — DRILL BIT TORNIER 3MM LONG DWD060

## (undated) DEVICE — DRSG ABDOMINAL 07 1/2X8" 7197D

## (undated) DEVICE — SHEATH URETERAL ACCESS NAVIGATOR HD 11/13FRX36CM M0062502220

## (undated) DEVICE — PACK VITRECTOMY PQ15VI57E

## (undated) DEVICE — GLOVE PROTEXIS MICRO 6.5  2D73PM65

## (undated) DEVICE — BONE CEMENT MIXEVAC HI VAC W/CARTRIDGE 0306-563-000

## (undated) DEVICE — BONE CLEANING TIP INTERPULSE  0210-010-000

## (undated) DEVICE — CONTRAST MEDIA ISOVUE 300 61% IOPAMIDOL  CHRG PER 1ML 131535

## (undated) DEVICE — DRSG GAUZE 4X4" 3033

## (undated) DEVICE — IMM SLING SHOULDER LG BUCKLE 79-84247

## (undated) DEVICE — ESU GROUND PAD UNIVERSAL W/O CORD

## (undated) DEVICE — BASKET STONE RETRIEVAL NTNL ZERO TIP 1.9FRX90CM M0063901050

## (undated) DEVICE — SYR 05ML SLIP TIP W/O NDL 309647

## (undated) DEVICE — PREP CHLORAPREP 26ML TINTED ORANGE  260815

## (undated) DEVICE — SU VICRYL 0 CTX 36" J370H

## (undated) DEVICE — PACK OPEN SHOULDER SOP15OCFSC

## (undated) DEVICE — GLOVE PROTEXIS MICRO 8.0  2D73PM80

## (undated) DEVICE — DRAPE CONVERTORS U-DRAPE 60X72" 8476

## (undated) DEVICE — NDL 18GA 1.5" 305196

## (undated) DEVICE — LASER FIBER HOLMIUM MOSES 200 D/F/L AC-10030100

## (undated) DEVICE — GLOVE PROTEXIS W/NEU-THERA 8.0  2D73TE80

## (undated) DEVICE — DRAIN ROUND W/RESERV KIT JACKSON PRATT 10FR 400ML SU130-402D

## (undated) DEVICE — SU FIBERWIRE 2 38"  AR-7200

## (undated) DEVICE — SU NDL MAYO TROCAR MED 217003

## (undated) DEVICE — SOL NACL 0.9% INJ 250ML BAG 2B1322Q

## (undated) DEVICE — HOOD FLYTE W/PEELAWAY 408-800-100

## (undated) DEVICE — PAD CHUX UNDERPAD 23X24" 7136

## (undated) DEVICE — STPL SKIN 35W APPOSE 8886803712

## (undated) DEVICE — PACK CYSTOSCOPY SBA15CYFSI

## (undated) DEVICE — EYE SOL BSS 500ML

## (undated) DEVICE — SU VICRYL 2-0 CP-1 27" J466H

## (undated) DEVICE — DRSG ADAPTIC 3X8" 6113

## (undated) DEVICE — BAG DRAIN URO FOR SIEMENS 8MM ADAPTER NS CC164NS-A

## (undated) DEVICE — GLOVE PROTEXIS POWDER FREE 8.0 ORTHOPEDIC 2D73ET80

## (undated) DEVICE — GLOVE PROTEXIS MICRO 7.0  2D73PM70

## (undated) RX ORDER — CIPROFLOXACIN 2 MG/ML
INJECTION, SOLUTION INTRAVENOUS
Status: DISPENSED
Start: 2022-11-22

## (undated) RX ORDER — FENTANYL CITRATE 0.05 MG/ML
INJECTION, SOLUTION INTRAMUSCULAR; INTRAVENOUS
Status: DISPENSED
Start: 2022-11-22

## (undated) RX ORDER — ATROPINE SULFATE 10 MG/ML
SOLUTION/ DROPS OPHTHALMIC
Status: DISPENSED
Start: 2017-10-25

## (undated) RX ORDER — ACETAMINOPHEN 325 MG/1
TABLET ORAL
Status: DISPENSED
Start: 2017-04-03

## (undated) RX ORDER — DEXAMETHASONE SODIUM PHOSPHATE 10 MG/ML
INJECTION, SOLUTION INTRAMUSCULAR; INTRAVENOUS
Status: DISPENSED
Start: 2017-10-25

## (undated) RX ORDER — PROPOFOL 10 MG/ML
INJECTION, EMULSION INTRAVENOUS
Status: DISPENSED
Start: 2022-11-22

## (undated) RX ORDER — ONDANSETRON 2 MG/ML
INJECTION INTRAMUSCULAR; INTRAVENOUS
Status: DISPENSED
Start: 2017-10-25

## (undated) RX ORDER — EPHEDRINE SULFATE 50 MG/ML
INJECTION, SOLUTION INTRAMUSCULAR; INTRAVENOUS; SUBCUTANEOUS
Status: DISPENSED
Start: 2022-11-22

## (undated) RX ORDER — CEFAZOLIN SODIUM 2 G/100ML
INJECTION, SOLUTION INTRAVENOUS
Status: DISPENSED
Start: 2017-04-03

## (undated) RX ORDER — EPHEDRINE SULFATE 50 MG/ML
INJECTION, SOLUTION INTRAVENOUS
Status: DISPENSED
Start: 2017-04-03

## (undated) RX ORDER — WATER 10 ML/10ML
INJECTION INTRAMUSCULAR; INTRAVENOUS; SUBCUTANEOUS
Status: DISPENSED
Start: 2017-10-25

## (undated) RX ORDER — DEXAMETHASONE SODIUM PHOSPHATE 4 MG/ML
INJECTION, SOLUTION INTRA-ARTICULAR; INTRALESIONAL; INTRAMUSCULAR; INTRAVENOUS; SOFT TISSUE
Status: DISPENSED
Start: 2017-04-03

## (undated) RX ORDER — HYDROMORPHONE HCL IN WATER/PF 6 MG/30 ML
PATIENT CONTROLLED ANALGESIA SYRINGE INTRAVENOUS
Status: DISPENSED
Start: 2022-11-22

## (undated) RX ORDER — FENTANYL CITRATE 50 UG/ML
INJECTION, SOLUTION INTRAMUSCULAR; INTRAVENOUS
Status: DISPENSED
Start: 2017-04-03

## (undated) RX ORDER — CEFAZOLIN SODIUM 500 MG/2.2ML
INJECTION, POWDER, FOR SOLUTION INTRAMUSCULAR; INTRAVENOUS
Status: DISPENSED
Start: 2017-10-25

## (undated) RX ORDER — LIDOCAINE HYDROCHLORIDE 20 MG/ML
INJECTION, SOLUTION EPIDURAL; INFILTRATION; INTRACAUDAL; PERINEURAL
Status: DISPENSED
Start: 2017-10-25

## (undated) RX ORDER — FENTANYL CITRATE 50 UG/ML
INJECTION, SOLUTION INTRAMUSCULAR; INTRAVENOUS
Status: DISPENSED
Start: 2022-11-22